# Patient Record
Sex: FEMALE | Race: WHITE | Employment: FULL TIME | URBAN - METROPOLITAN AREA
[De-identification: names, ages, dates, MRNs, and addresses within clinical notes are randomized per-mention and may not be internally consistent; named-entity substitution may affect disease eponyms.]

---

## 2017-04-02 PROCEDURE — 99285 EMERGENCY DEPT VISIT HI MDM: CPT

## 2017-04-03 ENCOUNTER — APPOINTMENT (OUTPATIENT)
Dept: MRI IMAGING | Age: 32
End: 2017-04-03
Attending: PSYCHIATRY & NEUROLOGY
Payer: COMMERCIAL

## 2017-04-03 ENCOUNTER — APPOINTMENT (OUTPATIENT)
Dept: CT IMAGING | Age: 32
End: 2017-04-03
Attending: EMERGENCY MEDICINE
Payer: COMMERCIAL

## 2017-04-03 ENCOUNTER — APPOINTMENT (OUTPATIENT)
Dept: MRI IMAGING | Age: 32
End: 2017-04-03
Attending: INTERNAL MEDICINE
Payer: COMMERCIAL

## 2017-04-03 ENCOUNTER — HOSPITAL ENCOUNTER (OUTPATIENT)
Age: 32
Setting detail: OBSERVATION
Discharge: HOME | End: 2017-04-04
Attending: EMERGENCY MEDICINE | Admitting: INTERNAL MEDICINE
Payer: COMMERCIAL

## 2017-04-03 DIAGNOSIS — R29.898 LUE WEAKNESS: Primary | ICD-10-CM

## 2017-04-03 DIAGNOSIS — R20.2 NUMBNESS AND TINGLING OF LEFT SIDE OF FACE: ICD-10-CM

## 2017-04-03 DIAGNOSIS — R20.0 NUMBNESS AND TINGLING OF LEFT SIDE OF FACE: ICD-10-CM

## 2017-04-03 DIAGNOSIS — R29.898 WEAKNESS OF LEFT UPPER EXTREMITY: ICD-10-CM

## 2017-04-03 LAB
ALBUMIN SERPL BCP-MCNC: 3.1 G/DL (ref 3.5–5)
ALBUMIN SERPL BCP-MCNC: 3.2 G/DL (ref 3.5–5)
ALBUMIN/GLOB SERPL: 0.9 {RATIO} (ref 1.1–2.2)
ALBUMIN/GLOB SERPL: 1 {RATIO} (ref 1.1–2.2)
ALP SERPL-CCNC: 56 U/L (ref 45–117)
ALP SERPL-CCNC: 57 U/L (ref 45–117)
ALT SERPL-CCNC: 23 U/L (ref 12–78)
ALT SERPL-CCNC: 25 U/L (ref 12–78)
AMPHET UR QL SCN: NEGATIVE
ANION GAP BLD CALC-SCNC: 10 MMOL/L (ref 5–15)
ANION GAP BLD CALC-SCNC: 9 MMOL/L (ref 5–15)
APPEARANCE UR: ABNORMAL
AST SERPL W P-5'-P-CCNC: 15 U/L (ref 15–37)
AST SERPL W P-5'-P-CCNC: 15 U/L (ref 15–37)
ATRIAL RATE: 62 BPM
BACTERIA URNS QL MICRO: ABNORMAL /HPF
BARBITURATES UR QL SCN: NEGATIVE
BASOPHILS # BLD AUTO: 0 K/UL (ref 0–0.1)
BASOPHILS # BLD AUTO: 0 K/UL (ref 0–0.1)
BASOPHILS # BLD: 0 % (ref 0–1)
BASOPHILS # BLD: 0 % (ref 0–1)
BENZODIAZ UR QL: NEGATIVE
BILIRUB SERPL-MCNC: 0.3 MG/DL (ref 0.2–1)
BILIRUB SERPL-MCNC: 0.4 MG/DL (ref 0.2–1)
BILIRUB UR QL: NEGATIVE
BUN SERPL-MCNC: 15 MG/DL (ref 6–20)
BUN SERPL-MCNC: 9 MG/DL (ref 6–20)
BUN/CREAT SERPL: 10 (ref 12–20)
BUN/CREAT SERPL: 20 (ref 12–20)
CALCIUM SERPL-MCNC: 8.6 MG/DL (ref 8.5–10.1)
CALCIUM SERPL-MCNC: 8.6 MG/DL (ref 8.5–10.1)
CALCULATED P AXIS, ECG09: 10 DEGREES
CALCULATED R AXIS, ECG10: 55 DEGREES
CALCULATED T AXIS, ECG11: 36 DEGREES
CANNABINOIDS UR QL SCN: NEGATIVE
CHLORIDE SERPL-SCNC: 105 MMOL/L (ref 97–108)
CHLORIDE SERPL-SCNC: 105 MMOL/L (ref 97–108)
CHOLEST SERPL-MCNC: 142 MG/DL
CK MB CFR SERPL CALC: NORMAL % (ref 0–2.5)
CK MB SERPL-MCNC: <1 NG/ML (ref 5–25)
CK SERPL-CCNC: 80 U/L (ref 26–192)
CO2 SERPL-SCNC: 25 MMOL/L (ref 21–32)
CO2 SERPL-SCNC: 29 MMOL/L (ref 21–32)
COCAINE UR QL SCN: NEGATIVE
COLOR UR: ABNORMAL
CREAT SERPL-MCNC: 0.74 MG/DL (ref 0.55–1.02)
CREAT SERPL-MCNC: 0.92 MG/DL (ref 0.55–1.02)
CRP SERPL-MCNC: 2.51 MG/DL (ref 0–0.6)
D DIMER PPP FEU-MCNC: 0.95 MG/L FEU (ref 0–0.65)
DIAGNOSIS, 93000: NORMAL
DRUG SCRN COMMENT,DRGCM: NORMAL
EOSINOPHIL # BLD: 0.2 K/UL (ref 0–0.4)
EOSINOPHIL # BLD: 0.2 K/UL (ref 0–0.4)
EOSINOPHIL NFR BLD: 2 % (ref 0–7)
EOSINOPHIL NFR BLD: 4 % (ref 0–7)
EPITH CASTS URNS QL MICRO: ABNORMAL /LPF
ERYTHROCYTE [DISTWIDTH] IN BLOOD BY AUTOMATED COUNT: 12.9 % (ref 11.5–14.5)
ERYTHROCYTE [DISTWIDTH] IN BLOOD BY AUTOMATED COUNT: 13 % (ref 11.5–14.5)
ERYTHROCYTE [SEDIMENTATION RATE] IN BLOOD: 6 MM/HR (ref 0–20)
EST. AVERAGE GLUCOSE BLD GHB EST-MCNC: NORMAL MG/DL
GLOBULIN SER CALC-MCNC: 3.3 G/DL (ref 2–4)
GLOBULIN SER CALC-MCNC: 3.6 G/DL (ref 2–4)
GLUCOSE SERPL-MCNC: 112 MG/DL (ref 65–100)
GLUCOSE SERPL-MCNC: 93 MG/DL (ref 65–100)
GLUCOSE UR STRIP.AUTO-MCNC: NEGATIVE MG/DL
HBA1C MFR BLD: 4.7 % (ref 4.2–6.3)
HCG UR QL: NEGATIVE
HCT VFR BLD AUTO: 38.5 % (ref 35–47)
HCT VFR BLD AUTO: 38.5 % (ref 35–47)
HDLC SERPL-MCNC: 67 MG/DL
HDLC SERPL: 2.1 {RATIO} (ref 0–5)
HGB BLD-MCNC: 13.2 G/DL (ref 11.5–16)
HGB BLD-MCNC: 13.4 G/DL (ref 11.5–16)
HGB UR QL STRIP: NEGATIVE
HYALINE CASTS URNS QL MICRO: ABNORMAL /LPF (ref 0–5)
KETONES UR QL STRIP.AUTO: 15 MG/DL
LDLC SERPL CALC-MCNC: 50.4 MG/DL (ref 0–100)
LEUKOCYTE ESTERASE UR QL STRIP.AUTO: ABNORMAL
LIPID PROFILE,FLP: NORMAL
LYMPHOCYTES # BLD AUTO: 39 % (ref 12–49)
LYMPHOCYTES # BLD AUTO: 45 % (ref 12–49)
LYMPHOCYTES # BLD: 2.8 K/UL (ref 0.8–3.5)
LYMPHOCYTES # BLD: 3 K/UL (ref 0.8–3.5)
MCH RBC QN AUTO: 30.9 PG (ref 26–34)
MCH RBC QN AUTO: 31.2 PG (ref 26–34)
MCHC RBC AUTO-ENTMCNC: 34.3 G/DL (ref 30–36.5)
MCHC RBC AUTO-ENTMCNC: 34.8 G/DL (ref 30–36.5)
MCV RBC AUTO: 89.7 FL (ref 80–99)
MCV RBC AUTO: 90.2 FL (ref 80–99)
METHADONE UR QL: NEGATIVE
MONOCYTES # BLD: 0.5 K/UL (ref 0–1)
MONOCYTES # BLD: 0.5 K/UL (ref 0–1)
MONOCYTES NFR BLD AUTO: 7 % (ref 5–13)
MONOCYTES NFR BLD AUTO: 7 % (ref 5–13)
MUCOUS THREADS URNS QL MICRO: ABNORMAL /LPF
NEUTS SEG # BLD: 2.9 K/UL (ref 1.8–8)
NEUTS SEG # BLD: 3.6 K/UL (ref 1.8–8)
NEUTS SEG NFR BLD AUTO: 44 % (ref 32–75)
NEUTS SEG NFR BLD AUTO: 52 % (ref 32–75)
NITRITE UR QL STRIP.AUTO: NEGATIVE
OPIATES UR QL: NEGATIVE
P-R INTERVAL, ECG05: 108 MS
PCP UR QL: NEGATIVE
PH UR STRIP: 6.5 [PH] (ref 5–8)
PLATELET # BLD AUTO: 182 K/UL (ref 150–400)
PLATELET # BLD AUTO: 190 K/UL (ref 150–400)
POTASSIUM SERPL-SCNC: 3.3 MMOL/L (ref 3.5–5.1)
POTASSIUM SERPL-SCNC: 3.4 MMOL/L (ref 3.5–5.1)
PROT SERPL-MCNC: 6.5 G/DL (ref 6.4–8.2)
PROT SERPL-MCNC: 6.7 G/DL (ref 6.4–8.2)
PROT UR STRIP-MCNC: ABNORMAL MG/DL
Q-T INTERVAL, ECG07: 446 MS
QRS DURATION, ECG06: 96 MS
QTC CALCULATION (BEZET), ECG08: 452 MS
RBC # BLD AUTO: 4.27 M/UL (ref 3.8–5.2)
RBC # BLD AUTO: 4.29 M/UL (ref 3.8–5.2)
RBC #/AREA URNS HPF: ABNORMAL /HPF (ref 0–5)
SODIUM SERPL-SCNC: 140 MMOL/L (ref 136–145)
SODIUM SERPL-SCNC: 143 MMOL/L (ref 136–145)
SP GR UR REFRACTOMETRY: 1.02 (ref 1–1.03)
TRIGL SERPL-MCNC: 123 MG/DL (ref ?–150)
TROPONIN I SERPL-MCNC: <0.04 NG/ML
UA: UC IF INDICATED,UAUC: ABNORMAL
UROBILINOGEN UR QL STRIP.AUTO: 1 EU/DL (ref 0.2–1)
VENTRICULAR RATE, ECG03: 62 BPM
VLDLC SERPL CALC-MCNC: 24.6 MG/DL
WBC # BLD AUTO: 6.5 K/UL (ref 3.6–11)
WBC # BLD AUTO: 7.1 K/UL (ref 3.6–11)
WBC URNS QL MICRO: ABNORMAL /HPF (ref 0–4)

## 2017-04-03 PROCEDURE — 96361 HYDRATE IV INFUSION ADD-ON: CPT

## 2017-04-03 PROCEDURE — 99218 HC RM OBSERVATION: CPT

## 2017-04-03 PROCEDURE — 86140 C-REACTIVE PROTEIN: CPT | Performed by: INTERNAL MEDICINE

## 2017-04-03 PROCEDURE — 72156 MRI NECK SPINE W/O & W/DYE: CPT

## 2017-04-03 PROCEDURE — 96367 TX/PROPH/DG ADDL SEQ IV INF: CPT

## 2017-04-03 PROCEDURE — 93005 ELECTROCARDIOGRAM TRACING: CPT

## 2017-04-03 PROCEDURE — 82550 ASSAY OF CK (CPK): CPT | Performed by: INTERNAL MEDICINE

## 2017-04-03 PROCEDURE — 85652 RBC SED RATE AUTOMATED: CPT | Performed by: EMERGENCY MEDICINE

## 2017-04-03 PROCEDURE — 96366 THER/PROPH/DIAG IV INF ADDON: CPT

## 2017-04-03 PROCEDURE — 36415 COLL VENOUS BLD VENIPUNCTURE: CPT | Performed by: INTERNAL MEDICINE

## 2017-04-03 PROCEDURE — 80307 DRUG TEST PRSMV CHEM ANLYZR: CPT | Performed by: INTERNAL MEDICINE

## 2017-04-03 PROCEDURE — 85025 COMPLETE CBC W/AUTO DIFF WBC: CPT | Performed by: EMERGENCY MEDICINE

## 2017-04-03 PROCEDURE — 74011250637 HC RX REV CODE- 250/637: Performed by: INTERNAL MEDICINE

## 2017-04-03 PROCEDURE — 85379 FIBRIN DEGRADATION QUANT: CPT | Performed by: INTERNAL MEDICINE

## 2017-04-03 PROCEDURE — 74011250636 HC RX REV CODE- 250/636: Performed by: INTERNAL MEDICINE

## 2017-04-03 PROCEDURE — 81025 URINE PREGNANCY TEST: CPT

## 2017-04-03 PROCEDURE — 96365 THER/PROPH/DIAG IV INF INIT: CPT

## 2017-04-03 PROCEDURE — 81001 URINALYSIS AUTO W/SCOPE: CPT | Performed by: EMERGENCY MEDICINE

## 2017-04-03 PROCEDURE — 80053 COMPREHEN METABOLIC PANEL: CPT | Performed by: EMERGENCY MEDICINE

## 2017-04-03 PROCEDURE — A9585 GADOBUTROL INJECTION: HCPCS | Performed by: INTERNAL MEDICINE

## 2017-04-03 PROCEDURE — 87086 URINE CULTURE/COLONY COUNT: CPT | Performed by: EMERGENCY MEDICINE

## 2017-04-03 PROCEDURE — 70450 CT HEAD/BRAIN W/O DYE: CPT

## 2017-04-03 PROCEDURE — 84484 ASSAY OF TROPONIN QUANT: CPT | Performed by: INTERNAL MEDICINE

## 2017-04-03 PROCEDURE — 73221 MRI JOINT UPR EXTREM W/O DYE: CPT

## 2017-04-03 PROCEDURE — 86038 ANTINUCLEAR ANTIBODIES: CPT | Performed by: PSYCHIATRY & NEUROLOGY

## 2017-04-03 PROCEDURE — 70553 MRI BRAIN STEM W/O & W/DYE: CPT

## 2017-04-03 PROCEDURE — 96372 THER/PROPH/DIAG INJ SC/IM: CPT

## 2017-04-03 PROCEDURE — 80061 LIPID PANEL: CPT | Performed by: INTERNAL MEDICINE

## 2017-04-03 PROCEDURE — 74011000258 HC RX REV CODE- 258: Performed by: INTERNAL MEDICINE

## 2017-04-03 PROCEDURE — 83036 HEMOGLOBIN GLYCOSYLATED A1C: CPT | Performed by: INTERNAL MEDICINE

## 2017-04-03 PROCEDURE — 80053 COMPREHEN METABOLIC PANEL: CPT | Performed by: INTERNAL MEDICINE

## 2017-04-03 RX ORDER — METHYLPHENIDATE HYDROCHLORIDE 27 MG/1
27 TABLET ORAL
COMMUNITY
End: 2020-12-17

## 2017-04-03 RX ORDER — SODIUM CHLORIDE AND POTASSIUM CHLORIDE .9; .15 G/100ML; G/100ML
SOLUTION INTRAVENOUS CONTINUOUS
Status: DISCONTINUED | OUTPATIENT
Start: 2017-04-03 | End: 2017-04-04

## 2017-04-03 RX ORDER — SODIUM CHLORIDE 0.9 % (FLUSH) 0.9 %
10 SYRINGE (ML) INJECTION
Status: COMPLETED | OUTPATIENT
Start: 2017-04-03 | End: 2017-04-03

## 2017-04-03 RX ORDER — POTASSIUM CHLORIDE 7.45 MG/ML
10 INJECTION INTRAVENOUS ONCE
Status: COMPLETED | OUTPATIENT
Start: 2017-04-03 | End: 2017-04-03

## 2017-04-03 RX ORDER — ACETAMINOPHEN 325 MG/1
650 TABLET ORAL
Status: DISCONTINUED | OUTPATIENT
Start: 2017-04-03 | End: 2017-04-04 | Stop reason: HOSPADM

## 2017-04-03 RX ORDER — POTASSIUM CHLORIDE 14.9 MG/ML
10 INJECTION INTRAVENOUS
Status: DISPENSED | OUTPATIENT
Start: 2017-04-03 | End: 2017-04-03

## 2017-04-03 RX ORDER — DOCUSATE SODIUM 100 MG/1
100 CAPSULE, LIQUID FILLED ORAL 2 TIMES DAILY
Status: DISCONTINUED | OUTPATIENT
Start: 2017-04-03 | End: 2017-04-04 | Stop reason: HOSPADM

## 2017-04-03 RX ORDER — HYDRALAZINE HYDROCHLORIDE 25 MG/1
25 TABLET, FILM COATED ORAL ONCE
Status: COMPLETED | OUTPATIENT
Start: 2017-04-04 | End: 2017-04-04

## 2017-04-03 RX ORDER — ONDANSETRON 2 MG/ML
4 INJECTION INTRAMUSCULAR; INTRAVENOUS
Status: DISCONTINUED | OUTPATIENT
Start: 2017-04-03 | End: 2017-04-04 | Stop reason: HOSPADM

## 2017-04-03 RX ORDER — ENOXAPARIN SODIUM 100 MG/ML
40 INJECTION SUBCUTANEOUS EVERY 24 HOURS
Status: DISCONTINUED | OUTPATIENT
Start: 2017-04-03 | End: 2017-04-04 | Stop reason: HOSPADM

## 2017-04-03 RX ADMIN — POTASSIUM CHLORIDE 10 MEQ: 10 INJECTION, SOLUTION INTRAVENOUS at 08:16

## 2017-04-03 RX ADMIN — SODIUM CHLORIDE AND POTASSIUM CHLORIDE: 9; 1.49 INJECTION, SOLUTION INTRAVENOUS at 21:36

## 2017-04-03 RX ADMIN — Medication 1 CAPSULE: at 10:44

## 2017-04-03 RX ADMIN — SODIUM CHLORIDE AND POTASSIUM CHLORIDE: 9; 1.49 INJECTION, SOLUTION INTRAVENOUS at 05:35

## 2017-04-03 RX ADMIN — ENOXAPARIN SODIUM 40 MG: 100 INJECTION SUBCUTANEOUS at 10:44

## 2017-04-03 RX ADMIN — DOCUSATE SODIUM 100 MG: 100 CAPSULE, LIQUID FILLED ORAL at 10:43

## 2017-04-03 RX ADMIN — GADOBUTROL 7.5 ML: 604.72 INJECTION INTRAVENOUS at 21:07

## 2017-04-03 RX ADMIN — CEFTRIAXONE 1 G: 1 INJECTION, POWDER, FOR SOLUTION INTRAMUSCULAR; INTRAVENOUS at 05:36

## 2017-04-03 RX ADMIN — Medication 10 ML: at 21:08

## 2017-04-03 RX ADMIN — POTASSIUM CHLORIDE 10 MEQ: 14.9 INJECTION, SOLUTION INTRAVENOUS at 06:22

## 2017-04-03 NOTE — ED TRIAGE NOTES
When I woke this am at 0800 my L arm felt numb and it has been weak feeling all day. It is tender to touch in the upper arm.

## 2017-04-03 NOTE — H&P
[address]   HISTORY AND PHYSICAL       Name:  Safia Mo   MR#:  [id number]   :  1985   Account #:  [de-identified]        Date of Adm:  2017       DATE OF ADMISSION: 2017     PRIMARY CARE PHYSICIAN: Sonido Mclean MD    SOURCE OF INFORMATION: The patient and the patient's    who was present at the bedside. CHIEF COMPLAINT: Left upper extremity weakness. HISTORY OF PRESENT ILLNESS: This is a 78-year-old woman with   a past medical history significant for borderline hypertension, polycystic   ovarian syndrome, suspected attention deficit hyperactivity disorder,   who was in her usual state of health until the day of her presentation in   the emergency room when the patient woke up with left upper   extremity weakness. According to the patient, she has had weakness   in all of her extremities for months, but the weakness involving the left   upper extremity got worse today. The patient woke up with weakness. She also has associated headache, which the patient described as a   throbbing headache, which went away when the patient drank plenty of   fluid. She was recently at [facility] for a [employer] party. The   patient stated that she drank more than usual. She has no history of   alcohol abuse. Because of persistent symptoms in the left upper   extremity, the patient was brought to the emergency room for further   evaluation. When the patient arrived at the emergency room, CT scan   of the head was obtained. This was negative. The patient was   subsequently referred to the hospitalist service for placement on   observation. The patient has no history of fever, no rigors, no chills. PAST MEDICAL HISTORY: Polycystic ovarian syndrome, suspected   attention deficit hyperactivity disorder, borderline hypertension. ALLERGIES: THE PATIENT IS ALLERGIC TO.   1. SULFA. 2. TETRACYCLINE. MEDICATIONS:   1. Metformin. 2. Nuvaring. 3. Concerta. FAMILY HISTORY: This was reviewed. No family history of a TIA or   CVA or multiple sclerosis or any neurological disorder. PAST SURGICAL HISTORY: This is significant for left shoulder   surgery 2 years ago following a sports related injury. SOCIAL HISTORY: The patient is a former smoker, admits to social   consumption of alcohol. REVIEW OF SYSTEMS   HEAD, EYES, EARS, NOSE AND THROAT: This is positive for   headache, no blurring of vision, no photophobia. RESPIRATORY: No cough, no shortness of breath, no hemoptysis. CARDIOVASCULAR: No chest pain, no orthopnea, no palpitation. GASTROINTESTINAL: No nausea, vomiting, no diarrhea, no   constipation. GENITOURINARY: No dysuria, no urgency, and no frequency. All other systems are reviewed and they are negative. PHYSICAL EXAMINATION   GENERAL APPEARANCE: The patient appeared ill, in moderate   distress. VITAL SIGNS: On arrival at the emergency room, temperature 97.9,   pulse 78, respiratory rate 16, blood pressure 177/110, oxygen   saturation 100% on room air. HEAD: Normocephalic, atraumatic. EYES: Normal eye movement. No redness, no drainage, no distress. EARS: Normal external ears with no obvious drainage. NOSE: No deformity, no drainage. MOUTH AND THROAT: No visible oral lesions. NECK: Supple. No JVD. No thyromegaly. CHEST: Clear breath sounds. No wheezing, no crackles. HEART: Normal S1 and S2, regular. No clinically appreciable murmur. ABDOMEN: Soft, nontender, normal bowel sounds. CENTRAL NERVOUS SYSTEM: Alert, oriented x3. No gross focal   neurological deficits. EXTREMITIES: No edema. Pulses 2+ bilaterally. MUSCULOSKELETAL: No obvious joint deformity or swelling. SKIN: No active skin lesions seen in the exposed parts of the body. PSYCHIATRY: Normal mood and affect. LYMPHATIC: No cervical lymphadenopathy. DIAGNOSTIC DATA: CT scan of the head without contrast negative. EKG shows sinus rhythm, no ST and T-wave abnormalities. LABORATORY DATA: Urinalysis: This is significant for negative   nitrites, trace leukocyte esterase, 1+ bacteria. Chemistry: Sodium 143,   potassium 3.3, chloride 105, CO2 of 29, glucose 112, BUN 9,   creatinine 0.92. Calcium 8.6, bilirubin total 0.3, ALT 25, AST 15,   alkaline phosphatase 57, total protein 6.7, albumin level 3.1, globulin at   3.6. Sedimentation rate 6. Hematology: WBC 7.1, hemoglobin 13.4,   hematocrit 38.5, platelets 756. Urine pregnancy test negative. ASSESSMENT:   1. Left upper extremity weakness. 2. Elevated blood pressure. 3. Polycystic ovarian syndrome. 4. Suspected attention deficit hyperactivity disorder. 5. Urinary tract infection. 6. Hypokalemia. PLAN:   1. Left upper extremity weakness. Will place the patient on   observation. The etiology of the left upper extremity weakness is not   clear at this time. We will obtain an MRI of the brain to evaluate the   patient for acute stroke as a possible cause of the left upper extremity. Neurology consult will be requested to assist in evaluation and   treatment. The patient may also require MRI of the cervical spine if   MRI of the brain is negative for acute pathology to evaluate the patient   for cervical radiculopathy as a possible cause of left upper extremity   weakness. The patient has a history of recent travel. She is also on   Nuvaring. There is a concern for hypercoagulable state as a possible   cause of left upper extremity weakness. The patient may require   further evaluation for the hypercoagulable state. Will obtain a D-dimer   to evaluate the patient for thromboembolism. The patient has a risk   factor for thromboembolism. 2. Elevated blood pressure. The patient has history of borderline   hypertension for which the patient is not on any medication. We will   check a TSH level. We will monitor the patient's blood pressure   closely.  The patient may require the antihypertensive medication if the   average blood pressure remains elevated. 3. Polycystic ovarian syndrome. The patient is on metformin for this   disorder. The patient will resume metformin upon discharge from the   hospital.   4. Suspected attention deficit hyperactivity disorder. The patient only   takes medication for this suspected disorder when she is at work. She   has not taken any medication for a few days because she is on   vacation. 5. Acute urinary tract infection. We will start the patient on Rocephin. Will await urine culture. 6. Hypokalemia. We will replace potassium, will repeat potassium   level. Will also check magnesium level. OTHER ISSUES:   1. CODE STATUS: The patient is a FULL CODE. 2. We will request SCDs for DVT prophylaxis.          Filemon Arias MD      RE / CD   D:  04/03/2017   03:46   T:  04/03/2017   04:59   Job #:  [id number]

## 2017-04-03 NOTE — ED PROVIDER NOTES
Patient is a 34 y.o. female presenting with weakness. Extremity Weakness    Pertinent negatives include no back pain and no neck pain. 34year old female with PCOS, seasonal allergies, borderline BP, presents with left upper extrem weakness and numbness all day. Woke up this morning and noticed weakness. SHe was in [place] this past weekend for a [employer] party and did drink more then normal. She does not think she fell or slept funny, she woke up sleeping on her right side. Did have a moderate throbbing headache but that seems better- she drank fluids thinking dehydration/hang over. No neck pain. No chest pain/palpitations/sob. Eating/drinking fine. No family history of stroke. She is on nuvoring and they have been monitoring her bp but not medicated. Denies facial involvement, speech or vision problems. Denies numbness/weakness in her leg. Felt some bilateral tingling in both legs and head earlier but not like what she is experiencing in her left upper arm and felt she was getting anxious about the arm weakness. Can't use her left hand. Patient is right handed. Past Medical History:   Diagnosis Date    H/O seasonal allergies     Polycystic ovary        Past Surgical History:   Procedure Laterality Date    HX ORTHOPAEDIC      L shoulder         History reviewed. No pertinent family history. Social History     Social History    Marital status: SINGLE     Spouse name: N/A    Number of children: N/A    Years of education: N/A     Occupational History    Not on file.      Social History Main Topics    Smoking status: Former Smoker    Smokeless tobacco: Not on file    Alcohol use Yes      Comment: socially    Drug use: No    Sexual activity: Not on file     Other Topics Concern    Not on file     Social History Narrative    No narrative on file         ALLERGIES: Codeine; Sulfa (sulfonamide antibiotics); and Tetracycline    Review of Systems   Constitutional: Negative for chills and fever.   HENT: Negative for congestion. Eyes: Negative for visual disturbance. Respiratory: Negative for cough and shortness of breath. Cardiovascular: Negative for chest pain. Gastrointestinal: Negative for abdominal pain, nausea and vomiting. Endocrine: Negative for polyuria. Genitourinary: Negative for dysuria. Musculoskeletal: Negative for back pain, gait problem and neck pain. Skin: Negative for rash. Neurological: Positive for weakness and headaches. Psychiatric/Behavioral: Negative for dysphoric mood. Vitals:    04/02/17 2351   BP: (!) 177/110   Pulse: 78   Resp: 16   Temp: 97.9 °F (36.6 °C)   SpO2: 100%   Weight: 80.9 kg (178 lb 6 oz)   Height: 5' 6\" (1.676 m)            Physical Exam   Constitutional: She is oriented to person, place, and time. She appears well-developed and well-nourished. No distress. HENT:   Head: Normocephalic and atraumatic. Mouth/Throat: Oropharynx is clear and moist. No oropharyngeal exudate. Eyes: Conjunctivae and EOM are normal. Pupils are equal, round, and reactive to light. Right eye exhibits no discharge. Left eye exhibits no discharge. No scleral icterus. Neck: Normal range of motion. Neck supple. No JVD present. Cardiovascular: Normal rate, regular rhythm, normal heart sounds and intact distal pulses. Exam reveals no gallop and no friction rub. No murmur heard. Pulmonary/Chest: Effort normal and breath sounds normal. No stridor. No respiratory distress. She has no wheezes. She has no rales. She exhibits no tenderness. Abdominal: Soft. Bowel sounds are normal. She exhibits no distension and no mass. There is no tenderness. There is no rebound and no guarding. Musculoskeletal: Normal range of motion. She exhibits no edema or tenderness. Neurological: She is alert and oriented to person, place, and time. She has normal reflexes. No cranial nerve deficit. She exhibits normal muscle tone.  Coordination normal.   Skin: Skin is warm and dry. No rash noted. No erythema. Psychiatric: She has a normal mood and affect. Her behavior is normal. Judgment and thought content normal.        [facility]  ED Course       Procedures      ED EKG interpretation:  Rhythm: normal sinus rhythm; and regular . Rate (approx.): 62; Axis: normal; P wave: normal; QRS interval: normal ; ST/T wave: non-specific changes;. This EKG was interpreted by Sofia Escobar MD,ED Provider. Work up reassuring- suspect nerve palsy but with numbness of entire arm, elevated BP/birth control risk factors, will admit for stroke work up.

## 2017-04-03 NOTE — ROUTINE PROCESS
TRANSFER - OUT REPORT:    Verbal report given to MARIA ELENA Villavicencio (name) on [facility]  being transferred to [address] [place] [address] (unit) for routine progression of care       Report consisted of patients Situation, Background, Assessment and   Recommendations(SBAR). Information from the following report(s) SBAR, ED Summary and Recent Results was reviewed with the receiving nurse. Lines:   Peripheral IV 04/03/17 Right Forearm (Active)   Site Assessment Clean, dry, & intact 4/3/2017  1:12 AM   Phlebitis Assessment 0 4/3/2017  1:12 AM   Infiltration Assessment 0 4/3/2017  1:12 AM   Dressing Status Clean, dry, & intact 4/3/2017  1:12 AM        Opportunity for questions and clarification was provided.       Patient transported with:   [employer]
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
## 2017-04-03 NOTE — IP AVS SNAPSHOT
Current Discharge Medication List  
  
START taking these medications Dose & Instructions Dispensing Information Comments Morning Noon Evening Bedtime  
 lisinopril 10 mg tablet Commonly known as:  Shellie Tracey Your last dose was: Your next dose is:    
   
   
 Dose:  10 mg Take 1 Tab by mouth daily. Indications: hypertension Quantity:  30 Tab Refills:  0  
     
   
   
   
  
 methylPREDNISolone 4 mg tablet Commonly known as:  Demario Rodrigues Your last dose was: Your next dose is:    
   
   
 Per dose pack instructions Quantity:  1 Dose Pack Refills:  0  
     
   
   
   
  
 OTHER(NON-FORMULARY) Your last dose was: Your next dose is: Outpatient physical therapy to evaluate and treat. Quantity:  1 Each Refills:  0 CONTINUE these medications which have NOT CHANGED Dose & Instructions Dispensing Information Comments Morning Noon Evening Bedtime CONCERTA 27 mg CR tablet Generic drug:  methylphenidate Your last dose was: Your next dose is:    
   
   
 Dose:  27 mg Take 27 mg by mouth every morning. Refills:  0 Where to Get Your Medications Information on where to get these meds will be given to you by the nurse or doctor. ! Ask your nurse or doctor about these medications  
  lisinopril 10 mg tablet  
 methylPREDNISolone 4 mg tablet OTHER(NON-FORMULARY)
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
## 2017-04-03 NOTE — CONSULTS
NEUROLOGY  4/3/2017     Consulted by: Brittnee Love MD        Patient ID:  Darin Reid  [id number]  34 y.o.  1985    Chief Complaint   Patient presents with    Extremity Weakness       HPI    Harpreet Menjivar is a 28-year-old woman with a history of hypertension and PCO S who is in the hospital for weakness. She tells me that yesterday morning she woke up and felt her left arm particularly in the hand and wrist was weak and numb. Felt some weakness in the shoulder. She ultimately drove home from [place] and during that time she felt her left leg might of had some patchy numbness as well. She also felt some facial numbness later in the day. She had a headache which resolved. She tells me that the night before presentation she was at a [employer] party and did drink more than usual.  She also returned from [facility] about 2 weeks ago where she was therefore work for about 1 week and was staying [place] a western style hotel. Overall symptoms are improving but the left hand still feels somewhat numb. No pain. Review of Systems   Constitutional: Positive for malaise/fatigue. Neurological: Positive for sensory change, focal weakness and headaches. All other systems reviewed and are negative. Past Medical History:   Diagnosis Date    H/O seasonal allergies     Polycystic ovary      History reviewed. No pertinent family history. Social History     Social History    Marital status: SINGLE     Spouse name: N/A    Number of children: N/A    Years of education: N/A     Occupational History    Not on file.      Social History Main Topics    Smoking status: Former Smoker    Smokeless tobacco: Not on file    Alcohol use Yes      Comment: socially    Drug use: No    Sexual activity: Not on file     Other Topics Concern    Not on file     Social History Narrative    No narrative on file     Current Facility-Administered Medications   Medication Dose Route Frequency    0.9% sodium chloride with KCl 20 mEq/L infusion   IntraVENous CONTINUOUS    acetaminophen (TYLENOL) tablet 650 mg  650 mg Oral Q4H PRN    ondansetron (ZOFRAN) injection 4 mg  4 mg IntraVENous Q4H PRN    docusate sodium (COLACE) capsule 100 mg  100 mg Oral BID    enoxaparin (LOVENOX) injection 40 mg  40 mg SubCUTAneous Q24H    cefTRIAXone (ROCEPHIN) 1 g in 0.9% sodium chloride (MBP/ADV) 50 mL  1 g IntraVENous Q24H    lactobac ac& pc-s.therm-b.anim (MARYSOL Q/RISAQUAD)  1 Cap Oral DAILY     Allergies   Allergen Reactions    Codeine Nausea Only    Sulfa (Sulfonamide Antibiotics) Hives and Swelling    Tetracycline Hives       Visit Vitals    BP (!) 157/96 (BP 1 Location: Left arm, BP Patient Position: At rest)    Pulse 70    Temp 98.8 °F (37.1 °C)    Resp 16    Ht 5' 6\" (1.676 m)    Wt 80.9 kg (178 lb 6 oz)    LMP 02/18/2017 (Exact Date)    SpO2 98%    Breastfeeding No    BMI 28.79 kg/m2     Physical Exam   Constitutional: She appears well-developed and well-nourished. Cardiovascular: Normal rate. Pulmonary/Chest: Effort normal.   Neurological: Gait normal.   Skin: Skin is warm and dry. Psychiatric: Her speech is normal.   Vitals reviewed. Neurologic Exam     Mental Status   Attention: normal.   Speech: speech is normal   Level of consciousness: alert    Cranial Nerves   Cranial nerves II through XII intact. Motor Exam   Muscle bulk: normal  Overall muscle tone: normal  Right arm pronator drift: absent  Left arm pronator drift: present       Distal left hand globally weak.      Sensory Exam   Light touch normal.     Gait, Coordination, and Reflexes     Gait  Gait: normal    Tremor   Resting tremor: absent           Lab Results  Component Value Date/Time   WBC 6.5 04/03/2017 05:40 AM   HGB 13.2 04/03/2017 05:40 AM   HCT 38.5 04/03/2017 05:40 AM   PLATELET 251 14/81/9516 05:40 AM   MCV 90.2 04/03/2017 05:40 AM       Lab Results  Component Value Date/Time   Hemoglobin A1c 4.7 04/03/2017 05:40 AM   Glucose 93 04/03/2017 05:40 AM   LDL, calculated 50.4 04/03/2017 05:40 AM   Creatinine 0.74 04/03/2017 05:40 AM      Lab Results  Component Value Date/Time   Cholesterol, total 142 04/03/2017 05:40 AM   HDL Cholesterol 67 04/03/2017 05:40 AM   LDL, calculated 50.4 04/03/2017 05:40 AM   Triglyceride 123 04/03/2017 05:40 AM   CHOL/HDL Ratio 2.1 04/03/2017 05:40 AM       Lab Results  Component Value Date/Time   ALT (SGPT) 23 04/03/2017 05:40 AM   AST (SGOT) 15 04/03/2017 05:40 AM   Alk. phosphatase 56 04/03/2017 05:40 AM   Bilirubin, total 0.4 04/03/2017 05:40 AM       No results found for: TSH, TSH2, TSH3, TSHP, TSHELE, TSHEXT, TT3, T3U, T3UP, FRT3, FT3, FT4, FT4P, T4, T4P, FT4T, TT7, TSHEXT      CT Results (maximum last 3): Results from [place] encounter on 04/03/17   CT HEAD WO CONT   Narrative EXAM:  CT HEAD WO CONT    INDICATION:   Left arm numb and weak today. COMPARISON: None. TECHNIQUE: Unenhanced CT of the head was performed using 5 mm images. Brain and  bone windows were generated. CT dose reduction was achieved through use of a  standardized protocol tailored for this examination and automatic exposure  control for dose modulation. FINDINGS:  The ventricles and sulci are normal in size, shape and configuration and  midline. There is no significant white matter disease. There is no intracranial  hemorrhage, extra-axial collection, mass, mass effect or midline shift. The  basilar cisterns are open. No acute infarct is identified. The bone windows  demonstrate no abnormalities. The visualized portions of the paranasal sinuses  and mastoid air cells are clear. Impression IMPRESSION: Normal            MRI Results (maximum last 3): Results from [place] encounter on 06/06/14   MRI SHOULDER LT WO CONT   Narrative **Final Report**      ICD Codes / Adm. Diagnosis: 719.41   / Pain in joint, shoulder region    Examination:  MR SHOULDER WO CON LT  - [id number] - Jun 6 2014  9:11AM  Accession No: [id number]  Reason:  Pain in joint, shoulder region      REPORT:  EXAM:  MR SHOULDER WO CON LT    INDICATION: Left shoulder pain post fall. Decreased range of motion    COMPARISON: None    TECHNIQUE: Axial proton density fat-saturated; oblique coronal T1, T2   fat-saturated, and proton density fat-saturated; and oblique sagittal T2   fat-saturated MRI of the left shoulder. CONTRAST: None. FINDINGS: A.C. joint: Normal for age. Anterior acromion process type: 2    Bone marrow: There is a nondisplaced fracture of the greater tuberosity    Joint fluid: There is trace fluid in the subacromial bursa    Rotator cuff tendons: There is slight edema within the supraspinatus due to   adjacent bony trauma. No rotator cuff tear    Biceps tendon: Intact and located within the bicipital groove. Muscles: No edema or atrophy. Glenoid labrum: Intact. Joint capsule: within normal limits. Glenohumeral articular cartilage: Intact. No focal osteochondral lesion. Soft tissue mass: None. IMPRESSION:  1. Nondisplaced acute fracture of the greater tuberosity with edema in the   adjacent rotator cuff. No rotator cuff tear    Findings were phoned to Dr. Juan Oliva office at the time of interpretation           Signing/Reading Doctor: Aman Hopkins ([id number])    Approved: Milo PNOCE ([id number])  Jun 6 2014 10:39AM                                   VAS/US/Carotid Doppler Results (maximum last 3): No results found for this or any previous visit. PET Results (maximum last 3): No results found for this or any previous visit. Assessment and Plan        44-year-old woman with left-sided paresthesias nonlocalizing. She has mild risk factors for stroke. Does not sound infectious in origin given the presentation and timeline. Migrainous presentation also possible but diagnosis of exclusion. MS on the differential.    Neuroimaging ordered.   MRI brain and cervical spine with contrast.    We will follow-up after imaging is done.         Brian Black DO  NEUROLOGIST  Diplomate ABPN  4/3/2017

## 2017-04-03 NOTE — PROGRESS NOTES
TRANSFER - IN REPORT:    Verbal report received from [facility]) on Verneda Duverney  being received from ED(unit) for routine progression of care      Report consisted of patients Situation, Background, Assessment and   Recommendations(SBAR). Information from the following report(s) SBAR, Kardex, ED Summary and Recent Results was reviewed with the receiving nurse. Opportunity for questions and clarification was provided. Assessment completed upon patients arrival to unit and care assumed.        Primary Nurse Laith Collins and MARIA ELENA Burch performed a dual skin assessment on this patient No impairment noted  Chris score is 23

## 2017-04-03 NOTE — IP AVS SNAPSHOT
[facility] [address] [place] [address] 
[phone] Patient: Verneda Duverney MRN: [id number] MTB:[birth year] You are allergic to the following Allergen Reactions Codeine Nausea Only Sulfa (Sulfonamide Antibiotics) Hives Swelling Tetracycline Hives Recent Documentation Height Weight Breastfeeding? BMI OB Status Smoking Status 1.676 m 80.9 kg No 28.79 kg/m2 Having regular periods Former Smoker Emergency Contacts Name Discharge Info Relation Home Work Mobile Everett Brizuela  Mother [14] [phone] Rosalio Jacobs  Boyfriend [17] [phone] About your hospitalization You were admitted on:  April 3, 2017 You last received care in the:  [address] [place] [address] You were discharged on:  April 4, 2017 Unit phone number:  [phone] Why you were hospitalized Your primary diagnosis was:  Weakness Of Left Upper Extremity Your diagnoses also included:  Numbness And Tingling Of Left Side Of Face, Essential Hypertension Providers Seen During Your Hospitalizations Provider Role Specialty Primary office phone Sathya Su MD Attending Provider Emergency Medicine [phone] Trae Gómez MD Attending Provider Internal Medicine [phone] Shanti Griggs MD Attending Provider Internal Medicine [phone] Your Primary Care Physician (PCP) Primary Care Physician Office Phone Office Fax Arherman Horneron [phone] Follow-up Information Follow up With Details Comments Contact Info Sera Quintana MD   Tuesday, April 11, 2017 at 12 noon [facility] [address] [place] [address] 
[phone] Kenyetta Arzola MD In 1 week Hospital follow up [facility]  [address] 
[phone] Current Discharge Medication List  
  
START taking these medications Dose & Instructions Dispensing Information Comments Morning Noon Evening Bedtime  
 lisinopril 10 mg tablet Commonly known as:  Karene Bolls Your last dose was: Your next dose is:    
   
   
 Dose:  10 mg Take 1 Tab by mouth daily. Indications: hypertension Quantity:  30 Tab Refills:  0  
     
   
   
   
  
 methylPREDNISolone 4 mg tablet Commonly known as:  Lorrane Norberto Your last dose was: Your next dose is:    
   
   
 Per dose pack instructions Quantity:  1 Dose Pack Refills:  0  
     
   
   
   
  
 OTHER(NON-FORMULARY) Your last dose was: Your next dose is: Outpatient physical therapy to evaluate and treat. Quantity:  1 Each Refills:  0 CONTINUE these medications which have NOT CHANGED Dose & Instructions Dispensing Information Comments Morning Noon Evening Bedtime CONCERTA 27 mg CR tablet Generic drug:  methylphenidate Your last dose was: Your next dose is:    
   
   
 Dose:  27 mg Take 27 mg by mouth every morning. Refills:  0 Where to Get Your Medications Information on where to get these meds will be given to you by the nurse or doctor. ! Ask your nurse or doctor about these medications  
  lisinopril 10 mg tablet  
 methylPREDNISolone 4 mg tablet OTHER(NON-FORMULARY) Discharge Instructions Discharge Instructions PATIENT ID: Mirella Rod MRN: [id number] YOB: 1985 DATE OF ADMISSION: 4/3/2017 12:05 AM   
DATE OF DISCHARGE: 4/4/2017 PRIMARY CARE PROVIDER: Holly Gamez MD  
 
ATTENDING PHYSICIAN: Ann Rubio MD 
DISCHARGING PROVIDER: Carlos Manuel Trent NP To contact this individual call [phone] and ask the  to page. If unavailable ask to be transferred the Adult Hospitalist Department. DISCHARGE DIAGNOSES Left arm weakness, High blood pressure CONSULTATIONS: IP CONSULT TO HOSPITALIST 
IP CONSULT TO NEUROLOGY PROCEDURES/SURGERIES: * No surgery found * PENDING TEST RESULTS:  
At the time of discharge the following test results are still pending: none FOLLOW UP APPOINTMENTS:  
Follow-up Information Follow up With Details Comments Contact Info Vidhya Hanna MD  Call to schedule an appointment in 2-3 weeks 9[facility]. [address] [place] [address] 
[phone] Tenzin Decker MD In 1 week Hospital follow up 163 [facility] [address] [place] [address] 13 [facility] 
[phone] ADDITIONAL CARE RECOMMENDATIONS:  
1. We would like you to follow up with Dr Elisabeth Avila with neurosurgery for your left arm numbness and spinal stenosis. See above for her contract information. 2. Your blood pressure was elevated during your admission, so we have start you on a blood pressure medication called Lisinopril. See below for more information on the medication to include common side effects. Make sure to follow up with your primary care provider for further blood pressure management. Make sure to check your blood pressure twice a day at home and record it. Take this record with you to your follow up with your primary care provider. 3. We have also prescribe you a steroid dose pack for your left arm numbness. Also recommending you start outpatient physical therapy. We have provided you with a script that you can take to a physical therapy center of your choice. DIET: cardiac, low salt diet ACTIVITY: as tolerated WOUND CARE: none EQUIPMENT needed: none DISCHARGE MEDICATIONS: 
Prednisone (By mouth) Prednisone (PRED-ni-sone) Treats many diseases and conditions, especially problems related to inflammation. This medicine is a corticosteroid. Brand Name(s):Deltasone, Prednicot, Gideon, Sterapred DS, predniSONE Intensol There may be other brand names for this medicine. When This Medicine Should Not Be Used: This medicine is not right for everyone. Do not use if you had an allergic reaction to prednisone or if you are pregnant. How to Use This Medicine:  
Liquid, Tablet, Delayed Release Tablet · Take your medicine as directed. Your dose may need to be changed several times to find what works best for you. · It is best to take this medicine with food or milk. · Swallow the delayed-release tablet whole. Do not crush, break, or chew it. · Measure the oral liquid medicine with a marked measuring spoon, oral syringe, or medicine cup. · Missed dose: Take a dose as soon as you remember. If it is almost time for your next dose, wait until then and take a regular dose. Do not take extra medicine to make up for a missed dose. · Store the medicine in a closed container at room temperature, away from heat, moisture, and direct light. Do not freeze the oral liquid. Drugs and Foods to Avoid: Ask your doctor or pharmacist before using any other medicine, including over-the-counter medicines, vitamins, and herbal products. · Tell your doctor if you use any of the following: ¨ Aminoglutethimide, amphotericin B, carbamazepine, cholestyramine, cyclosporine, digoxin, isoniazid, ketoconazole, phenobarbital, phenytoin, or rifampin ¨ Blood thinner, such as warfarin ¨ NSAID pain or arthritis medicine, such as aspirin, diclofenac, ibuprofen, naproxen, celecoxib ¨ Diuretic (water pill) ¨ Diabetes medicine ¨ Macrolide antibiotic, such as azithromycin, clarithromycin, erythromycin ¨ Estrogen, including birth control pills or hormone replacement therapy · This medicine may interfere with vaccines. Ask your doctor before you get a flu shot or any other vaccines. Warnings While Using This Medicine: · It is not safe to take this medicine during pregnancy. It could harm an unborn baby. Tell your doctor right away if you become pregnant. · Tell your doctor if you are breastfeeding or if you have kidney problems, heart failure, high blood pressure, a recent heart attack, diabetes, glaucoma, osteoporosis, or thyroid problems. Tell your doctor about any infection you have. Also tell your doctor if you have had mental or emotional problems (such as depression) or stomach or bowel problems (such as an ulcer or diverticulitis). · This medicine may cause the following problems: ¨ Mood or behavior changes ¨ Higher blood pressure, retaining water, changes in salt or potassium levels in your body ¨ Cataracts or glaucoma (with long-term use) ¨ Weak bones or osteoporosis (with long-term use) ¨ Slow growth in children (with long-term use) ¨ Muscle problems (with high doses, especially if you have myasthenia gravis or similar nerve and muscle problems) · Do not stop using this medicine suddenly. Your doctor will need to slowly decrease your dose before you stop it completely. · This medicine could cause you to get infections more easily. Tell your doctor right away if you are exposed to chicken pox, measles, or other serious infection. Tell your doctor if you had a serious infection in the past, such as tuberculosis or herpes. · Tell your doctor about any extra stress or anxiety in your life. Your dose might need to be changed for a short time. · Tell any doctor or dentist who treats you that you are using this medicine. This medicine may affect certain medical test results. · Keep all medicine out of the reach of children. Never share your medicine with anyone. Possible Side Effects While Using This Medicine:  
Call your doctor right away if you notice any of these side effects: · Allergic reaction: Itching or hives, swelling in your face or hands, swelling or tingling in your mouth or throat, chest tightness, trouble breathing · Dark freckles, skin color changes, coldness, weakness, tiredness, nausea, vomiting, weight loss · Depression, unusual thoughts, feelings, or behaviors, trouble sleeping · Fever, chills, cough, sore throat, and body aches · Muscle pain or weakness · Rapid weight gain, swelling in your hands, ankles, or feet · Severe stomach pain, nausea, vomiting, or red or black stools · Skin changes or growths · Trouble seeing, eye pain, headache If you notice these less serious side effects, talk with your doctor: · Increased appetite · Round, puffy face · Weight gain around your neck, upper back, breast, face, or waist 
If you notice other side effects that you think are caused by this medicine, tell your doctor. Call your doctor for medical advice about side effects. You may report side effects to FDA at 8-051-CSD-0100 © 2016 [address] is for End User's use only and may not be sold, redistributed or otherwise used for commercial purposes. The above information is an  only. It is not intended as medical advice for individual conditions or treatments. Talk to your doctor, nurse or pharmacist before following any medical regimen to see if it is safe and effective for you. Lisinopril (By mouth) Lisinopril (lye-SIN-oh-pril) Treats high blood pressure and heart failure. Also given to reduce the risk of death after a heart attack. This medicine is an ACE inhibitor. Brand Name(s):Prinivil, Zestril There may be other brand names for this medicine. When This Medicine Should Not Be Used: This medicine is not right for everyone. Do not use it if you had an allergic reaction to lisinopril or another ACE inhibitor, or if you are pregnant. How to Use This Medicine:  
Tablet · Take your medicine as directed. Your dose may need to be changed several times to find what works best for you. · Missed dose: Take a dose as soon as you remember. If it is almost time for your next dose, wait until then and take a regular dose.  Do not take extra medicine to make up for a missed dose. · Store the medicine in a closed container at room temperature, away from heat, moisture, and direct light. Drugs and Foods to Avoid: Ask your doctor or pharmacist before using any other medicine, including over-the-counter medicines, vitamins, and herbal products. · Do not use this medicine together with aliskiren if you have diabetes. · Some foods and medicines may affect how lisinopril works. Tell your doctor if you are using any of the following: ¨ Aliskiren, everolimus, lithium, sirolimus, temsirolimus ¨ Another blood pressure medicine, including an angiotensin receptor blocker (ARB) ¨ Insulin or diabetes medicine ¨ Diuretic (water pills, including amiloride, spironolactone, triamterene) ¨ NSAID pain or arthritis medicine (including aspirin, celecoxib, diclofenac, ibuprofen, naproxen) · Ask your doctor before you use any medicine, supplement, or salt substitute that contains potassium. Warnings While Using This Medicine: · It is not safe to take this medicine during pregnancy. It could harm an unborn baby. Tell your doctor right away if you become pregnant. · Tell your doctor if you are breastfeeding, or if you have kidney disease, liver disease, diabetes, or heart or blood vessel disease. · This medicine may cause the following problems: ¨ Angioedema (severe swelling) ¨ Kidney problems ¨ Serious liver problems · This medicine could lower your blood pressure too much, especially when you first use it or if you are dehydrated. Stand or sit up slowly if you feel lightheaded or dizzy. · Do not stop using this medicine without asking your doctor, even if you feel well. This medicine will not cure your high blood pressure, but it will help keep it in a normal range. You may have to take blood pressure medicine for the rest of your life. · Tell any doctor or dentist who treats you that you are using this medicine. · Your doctor will do lab tests at regular visits to check on the effects of this medicine. Keep all appointments. · Keep all medicine out of the reach of children. Never share your medicine with anyone. Possible Side Effects While Using This Medicine:  
Call your doctor right away if you notice any of these side effects: · Allergic reaction: Itching or hives, swelling in your face or hands, swelling or tingling in your mouth or throat, chest tightness, trouble breathing · Blistering, peeling, or red skin rash · Change in how much or how often you urinate · Confusion, weakness, uneven heartbeat, trouble breathing, numbness or tingling in your hands, feet, or lips · Dark urine or pale stools, nausea, vomiting, loss of appetite, stomach pain, yellow skin or eyes · Fever, chills, sore throat, body aches · Lightheadedness, dizziness, fainting · Severe stomach pain (with or without nausea or vomiting) If you notice these less serious side effects, talk with your doctor: · Dry cough If you notice other side effects that you think are caused by this medicine, tell your doctor. Call your doctor for medical advice about side effects. You may report side effects to FDA at 5-938-FDA-8590 © 2016 [address] is for End User's use only and may not be sold, redistributed or otherwise used for commercial purposes. The above information is an  only. It is not intended as medical advice for individual conditions or treatments. Talk to your doctor, nurse or pharmacist before following any medical regimen to see if it is safe and effective for you. See Medication Reconciliation Form · It is important that you take the medication exactly as they are prescribed. · Keep your medication in the bottles provided by the pharmacist and keep a list of the medication names, dosages, and times to be taken in your wallet. · Do not take other medications without consulting your doctor. NOTIFY YOUR PHYSICIAN FOR ANY OF THE FOLLOWING:  
Fever over 101 degrees for 24 hours. Chest pain, shortness of breath, fever, chills, nausea, vomiting, diarrhea, change in mentation, falling, weakness, bleeding. Severe pain or pain not relieved by medications. Or, any other signs or symptoms that you may have questions about. DISPOSITION: 
X  Home With: 
 OT  PT  [facility]  RN  
  
 SNF/Inpatient Rehab/LTAC Independent/assisted living Hospice Other: PROBLEM LIST Updated: Yes X Signed:  
Camron Issa NP 
4/4/2017 
8:59 AM 
 
 
 
  
Learning About High Blood Pressure What is high blood pressure? Blood pressure is a measure of how hard the blood pushes against the walls of your arteries. It's normal for blood pressure to go up and down throughout the day, but if it stays up, you have high blood pressure. Another name for high blood pressure is hypertension. Two numbers tell you your blood pressure. The first number is the systolic pressure. It shows how hard the blood pushes when your heart is pumping. The second number is the diastolic pressure. It shows how hard the blood pushes between heartbeats, when your heart is relaxed and filling with blood. A blood pressure of less than 120/80 (say \"120 over 80\") is ideal for an adult. High blood pressure is 140/90 or higher. You have high blood pressure if your top number is 140 or higher or your bottom number is 90 or higher, or both. Many people fall into the category in between, called prehypertension. People with prehypertension need to make lifestyle changes to bring their blood pressure down and help prevent or delay high blood pressure. What happens when you have high blood pressure? · Blood flows through your arteries with too much force. Over time, this damages the walls of your arteries. But you can't feel it.  High blood pressure usually doesn't cause symptoms. · Fat and calcium start to build up in your arteries. This buildup is called plaque. Plaque makes your arteries narrower and stiffer. Blood can't flow through them as easily. · This lack of good blood flow starts to damage some of the organs in your body. This can lead to problems such as coronary artery disease and heart attack, heart failure, stroke, kidney failure, and eye damage. How can you prevent high blood pressure? · Stay at a healthy weight. · Try to limit how much sodium you eat to less than 2,300 milligrams (mg) a day. If you limit your sodium to 1,500 mg a day, you can lower your blood pressure even more. ¨ Buy foods that are labeled \"unsalted,\" \"sodium-free,\" or \"low-sodium. \" Foods labeled \"reduced-sodium\" and \"light sodium\" may still have too much sodium. ¨ Flavor your food with garlic, lemon juice, onion, vinegar, herbs, and spices instead of salt. Do not use soy sauce, steak sauce, onion salt, garlic salt, mustard, or ketchup on your food. ¨ Use less salt (or none) when recipes call for it. You can often use half the salt a recipe calls for without losing flavor. · Be physically active. Get at least 30 minutes of exercise on most days of the week. Walking is a good choice. You also may want to do other activities, such as running, swimming, cycling, or playing tennis or team sports. · Limit alcohol to 2 drinks a day for men and 1 drink a day for women. · Eat plenty of fruits, vegetables, and low-fat dairy products. Eat less saturated and total fats. How is high blood pressure treated? · Your doctor will suggest making lifestyle changes. For example, your doctor may ask you to eat healthy foods, quit smoking, lose extra weight, and be more active. · If lifestyle changes don't help enough or your blood pressure is very high, you will have to take medicine every day. Follow-up care is a key part of your treatment and safety.  Be sure to make and go to all appointments, and call your doctor if you are having problems. It's also a good idea to know your test results and keep a list of the medicines you take. Where can you learn more? Go to http://kirk-tj.info/. Enter P501 in the search box to learn more about \"Learning About High Blood Pressure. \" Current as of: 2016 Content Version: 11.2 © 0480-2294 [employer]. Care instructions adapted under license by [employer] (which disclaims liability or warranty for this information). If you have questions about a medical condition or this instruction, always ask your healthcare professional. [address] any warranty or liability for your use of this information. Draftster Activation Thank you for requesting access to Draftster. Please follow the instructions below to securely access and download your online medical record. Draftster allows you to send messages to your doctor, view your test results, renew your prescriptions, schedule appointments, and more. How Do I Sign Up? 1. In your internet browser, go to [email or web address] 
2. Click on the First Time User? Click Here link in the Sign In box. You will be redirect to the New Member Sign Up page. 3. Enter your Draftster Access Code exactly as it appears below. You will not need to use this code after youve completed the sign-up process. If you do not sign up before the expiration date, you must request a new code. Draftster Access Code: [email or web address] Expires: 2017 11:51 PM (This is the date your Draftster access code will ) 4. Enter the last four digits of your Social Security Number (xxxx) and Date of Birth (mm/dd/yyyy) as indicated and click Submit. You will be taken to the next sign-up page. 5. Create a Draftster ID. This will be your Draftster login ID and cannot be changed, so think of one that is secure and easy to remember. 6. Create a Breezie password. You can change your password at any time. 7. Enter your Password Reset Question and Answer. This can be used at a later time if you forget your password. 8. Enter your e-mail address. You will receive e-mail notification when new information is available in [address] 9. Click Sign Up. You can now view and download portions of your medical record. 10. Click the Download Summary menu link to download a portable copy of your medical information. Additional Information If you have questions, please visit the Frequently Asked Questions section of the Breezie website at [email or web address] Remember, Breezie is NOT to be used for urgent needs. For medical emergencies, dial 911. Discharge Orders None Breezie Announcement We are excited to announce that we are making your provider's discharge notes available to you in Breezie. You will see these notes when they are completed and signed by the physician that discharged you from your recent hospital stay. If you have any questions or concerns about any information you see in Breezie, please call the Health Information Department where you were seen or reach out to your Primary Care Provider for more information about your plan of care. Introducing [facility]! [employer] introduces Breezie patient portal. Now you can access parts of your medical record, email your doctor's office, and request medication refills online. 1. In your internet browser, go to [email or web address] 2. Click on the First Time User? Click Here link in the Sign In box. You will see the New Member Sign Up page. 3. Enter your Breezie Access Code exactly as it appears below. You will not need to use this code after youve completed the sign-up process. If you do not sign up before the expiration date, you must request a new code.  
 
· Breezie Access Code: [email or web address] 
 Expires: 7/1/2017 11:51 PM 
 
4. Enter the last four digits of your Social Security Number (xxxx) and Date of Birth (mm/dd/yyyy) as indicated and click Submit. You will be taken to the next sign-up page. 5. Create a [email or web address] ID. This will be your [email or web address] login ID and cannot be changed, so think of one that is secure and easy to remember. 6. Create a [email or web address] password. You can change your password at any time. 7. Enter your Password Reset Question and Answer. This can be used at a later time if you forget your password. 8. Enter your e-mail address. You will receive e-mail notification when new information is available in [address] 9. Click Sign Up. You can now view and download portions of your medical record. 10. Click the Download Summary menu link to download a portable copy of your medical information. If you have questions, please visit the Frequently Asked Questions section of the [email or web address] website. Remember, [email or web address] is NOT to be used for urgent needs. For medical emergencies, dial 911. Now available from your iPhone and Android! General Information Please provide this summary of care documentation to your next provider. Patient Signature:  ____________________________________________________________ Date:  ____________________________________________________________  
  
Marlo Breath Provider Signature:  ____________________________________________________________ Date:  ____________________________________________________________

## 2017-04-04 VITALS
OXYGEN SATURATION: 97 % | HEART RATE: 72 BPM | BODY MASS INDEX: 28.67 KG/M2 | DIASTOLIC BLOOD PRESSURE: 93 MMHG | TEMPERATURE: 98.5 F | HEIGHT: 66 IN | WEIGHT: 178.38 LBS | RESPIRATION RATE: 14 BRPM | SYSTOLIC BLOOD PRESSURE: 152 MMHG

## 2017-04-04 PROBLEM — I10 ESSENTIAL HYPERTENSION: Status: ACTIVE | Noted: 2017-04-04

## 2017-04-04 LAB
ANA SER QL: NEGATIVE
BACTERIA SPEC CULT: NORMAL
CC UR VC: NORMAL
CK MB CFR SERPL CALC: NORMAL % (ref 0–2.5)
CK MB SERPL-MCNC: <1 NG/ML (ref 5–25)
CK SERPL-CCNC: 59 U/L (ref 26–192)
MAGNESIUM SERPL-MCNC: 1.9 MG/DL (ref 1.6–2.4)
PHOSPHATE SERPL-MCNC: 2.6 MG/DL (ref 2.6–4.7)
SEE BELOW:, 164879: NORMAL
SERVICE CMNT-IMP: NORMAL
TROPONIN I SERPL-MCNC: <0.04 NG/ML
TSH SERPL DL<=0.05 MIU/L-ACNC: 1.4 UIU/ML (ref 0.36–3.74)

## 2017-04-04 PROCEDURE — 74011250637 HC RX REV CODE- 250/637: Performed by: INTERNAL MEDICINE

## 2017-04-04 PROCEDURE — 84443 ASSAY THYROID STIM HORMONE: CPT | Performed by: INTERNAL MEDICINE

## 2017-04-04 PROCEDURE — 96361 HYDRATE IV INFUSION ADD-ON: CPT

## 2017-04-04 PROCEDURE — 36415 COLL VENOUS BLD VENIPUNCTURE: CPT | Performed by: INTERNAL MEDICINE

## 2017-04-04 PROCEDURE — 84100 ASSAY OF PHOSPHORUS: CPT | Performed by: INTERNAL MEDICINE

## 2017-04-04 PROCEDURE — 83735 ASSAY OF MAGNESIUM: CPT | Performed by: INTERNAL MEDICINE

## 2017-04-04 PROCEDURE — 84484 ASSAY OF TROPONIN QUANT: CPT | Performed by: INTERNAL MEDICINE

## 2017-04-04 PROCEDURE — 99218 HC RM OBSERVATION: CPT

## 2017-04-04 PROCEDURE — 82550 ASSAY OF CK (CPK): CPT | Performed by: INTERNAL MEDICINE

## 2017-04-04 RX ORDER — LISINOPRIL 10 MG/1
10 TABLET ORAL DAILY
Status: DISCONTINUED | OUTPATIENT
Start: 2017-04-04 | End: 2017-04-04 | Stop reason: HOSPADM

## 2017-04-04 RX ORDER — LISINOPRIL 10 MG/1
10 TABLET ORAL DAILY
Qty: 30 TAB | Refills: 0 | Status: SHIPPED | OUTPATIENT
Start: 2017-04-04 | End: 2020-12-17

## 2017-04-04 RX ORDER — METHYLPREDNISOLONE 4 MG/1
TABLET ORAL
Qty: 1 DOSE PACK | Refills: 0 | Status: SHIPPED | OUTPATIENT
Start: 2017-04-04 | End: 2020-12-17

## 2017-04-04 RX ADMIN — LISINOPRIL 10 MG: 10 TABLET ORAL at 09:12

## 2017-04-04 RX ADMIN — HYDRALAZINE HYDROCHLORIDE 25 MG: 25 TABLET, FILM COATED ORAL at 00:28

## 2017-04-04 RX ADMIN — Medication 1 CAPSULE: at 09:13

## 2017-04-04 NOTE — PROGRESS NOTES
Discharge instructions reviewed with patient and S.O.  Many questions asked and answered. Patient requested we make follow up appointment  for Dr. Bryan Archibald for her. Patient given prescriptions for Medrol dose pack, Lisinopril and outpatient PT. Discharged home via [facility] an personal belongings.

## 2017-04-04 NOTE — PROGRESS NOTES
[address] April 4, 2017       RE: Debra Lange      To Whom It May Concern,    This is to certify that Debra Lange was in the hospital from 4/3/2017 to 4/4/2017. We do not recommend the patient travel until cleared by her primary care provider. She may resume other work responsibilities tomorrow 4/5/2017. Please feel free to contact my office if you have any questions or concerns. Thank you for your assistance in this matter.       Sincerely,      Maksim Scott, EWA   [phone]

## 2017-04-04 NOTE — CONSULTS
Neurology Progress Note    Patient ID:  Chepe Tavarez  [id number]  34 y.o.  1985    Chief Complaint: Numbness and weakness    Subjective:     Percy Arevalo is a 23-year-old woman who presented with non-localizing left arm numbness and weakness and also some face and leg involvement. MRI brain was done which is unrevealing. C-spine was done as well and shows some degenerative disease and lateral recess effacement at C6-7. She does not have any neck pain. Objective:       ROS:  Per HPI  All other 12 pt ROS negative    Meds:  Current Facility-Administered Medications   Medication Dose Route Frequency    lisinopril (PRINIVIL, ZESTRIL) tablet 10 mg  10 mg Oral DAILY    acetaminophen (TYLENOL) tablet 650 mg  650 mg Oral Q4H PRN    ondansetron (ZOFRAN) injection 4 mg  4 mg IntraVENous Q4H PRN    docusate sodium (COLACE) capsule 100 mg  100 mg Oral BID    enoxaparin (LOVENOX) injection 40 mg  40 mg SubCUTAneous Q24H    lactobac ac& pc-s.therm-b.anim (MARYSOL Q/RISAQUAD)  1 Cap Oral DAILY       MRI Results (maximum last 3): Results from [place] encounter on 04/03/17   MRI SHOULDER LT WO CONT   Narrative EXAM:  MRI SHOULDER LT WO CONT    INDICATION: Acute onset left upper extremity weakness. No recent trauma    COMPARISON: June 6, 2014    TECHNIQUE: Axial proton density fat-saturated; oblique coronal T1, T2  fat-saturated, and proton density fat-saturated; and oblique sagittal T2  fat-saturated MRI of the left shoulder . CONTRAST: None. FINDINGS: A.C. joint: Normal for age. Anterior acromion process type: 2    Bone marrow: Fracture of the greater tuberosity has healed No acute fracture,  dislocation, or marrow replacing process. Joint fluid: None. Rotator cuff tendons: Intact. Biceps tendon: Intact and located within the bicipital groove. Muscles: No edema or atrophy. Glenoid labrum: Intact. Joint capsule: within normal limits. Glenohumeral articular cartilage: Intact.  No focal osteochondral lesion. Soft tissue mass: None. Impression IMPRESSION:      No evidence of internal derangement      MRI CERV SPINE W WO CONT   Narrative *PRELIMINARY REPORT*    Disc bulges at C5-6 and C6-7, but no significant central canal stenosis    Preliminary report was provided by Dr. Yehuda Echeverria, the on-call radiologist, at 2126  hours    Final report to follow. *END PRELIMINARY REPORT*    Study: MRI CERV SPINE W WO CONT    Indication:  32F with LUE weakness, leg and face. Need brain and c/s with amanda    Additional clinical history:    Comparison:  none available. Contrast: 7.5 Gadavist gadolinium contrast administered intravenously. Technique:  Magnetic resonance images of the cervical spine were obtained. The  following sequences were obtained: Sagittal T1, T2, T2 STIR; axial T1 and T2;  sagittal and axial T1 postcontrast.    Findings:  Alignment is anatomic without subluxation. Vertebral body and disc space heights  are maintained. The craniocervical junction is intact. Normal signal is seen in  the visualized osseous structures. The spinal cord is normal caliber and signal.  No concerning signal changes are seen in the visualized paraspinal soft tissues. Postcontrast imaging demonstrates no abnormal enhancement. C2-C3: No neuroforaminal narrowing or spinal canal stenosis. C3-C4: No neuroforaminal narrowing or spinal canal stenosis. C4-C5: No neuroforaminal narrowing or spinal canal stenosis. C5-C6: Minimal disc bulge with superimposed small right paracentral/foraminal  disc protrusion. Mild/moderate right neuroforaminal narrowing. No significant  spinal canal stenosis. C6-C7: Broad-based small to moderate-sized central/left paracentral disc  protrusion. The left lateral recess is partially effaced. No significant  neuroforaminal narrowing. Mild spinal canal stenosis. C7-T1: No neuroforaminal narrowing or spinal canal stenosis.          Impression Impression:  Disc bulges/protrusions at C5-C6 and C6-C7 described above. Mild/moderate right  neuroforaminal narrowing at C5-C6. Mild spinal canal stenosis at C6-C7. No  abnormal signal or enhancement in the spinal cord. MRI BRAIN W WO CONT   Narrative *PRELIMINARY REPORT*    No acute stroke    Preliminary report was provided by Dr. Maine Hamilton, the on-call radiologist, at 2124  hours    Final report to follow. *END PRELIMINARY REPORT*    Study: MRI BRAIN W WO CONT    Indication:  32F with LUE weakness, leg and face. Need brain and c/s with amanda    Additional clinical history:    Comparison: CT 4/3/2017. Contrast:  7.5  mL Gadavist gadolinium contrast  administered intravenously. Technique: The following sequences were obtained: Axial T1, T2, T2 FLAIR,  gradient echo; sagittal T1;   axial and coronal postcontrast T1;  diffusion-weighted imaging. Findings:  The ventricles and sulci are normal in appearance. There is no mass effect or  midline shift. There is no intracranial hemorrhage. A small area of focal  atrophy associated with T2 hyperintense signal is seen in the left posterior  cerebellum, likely related to remote infarct. No other signal changes are seen  in the brain. Diffusion-weighted imaging demonstrates no evidence of diffusion restriction. Postcontrast imaging demonstrates no abnormal enhancement. There are normal appearing vascular flow voids. The orbits are intact. Patchy mucosal thickening is seen and the paranasal  sinuses and ethmoid air cells. No air-fluid levels to to suggest acute  sinusitis. Impression Impression:  Focal T2 hyperintense signal in the left posterior cerebellum associated with  minimal atrophy suggestive of remote ischemic insult. No acute findings.                 Lab Review   Recent Results (from the past 24 hour(s))   PHOSPHORUS    Collection Time: 04/04/17  5:22 AM   Result Value Ref Range    Phosphorus 2.6 2.6 - 4.7 MG/DL   MAGNESIUM    Collection Time: 04/04/17  5:22 AM   Result Value Ref Range    Magnesium 1.9 1.6 - 2.4 mg/dL   CK W/ CKMB & INDEX    Collection Time: 04/04/17  5:22 AM   Result Value Ref Range    CK 59 26 - 192 U/L    CK - MB <1.0 <3.6 NG/ML    CK-MB Index Cannot be calulated 0 - 2.5     TROPONIN I    Collection Time: 04/04/17  5:22 AM   Result Value Ref Range    Troponin-I, Qt. <0.04 <0.05 ng/mL   TSH 3RD GENERATION    Collection Time: 04/04/17  5:22 AM   Result Value Ref Range    TSH 1.40 0.36 - 3.74 uIU/mL       Additional comments:I reviewed the patients new imaging test results. Patient Vitals for the past 8 hrs:   BP Temp Pulse Resp SpO2   04/04/17 0906 (!) 152/93 - 72 - -   04/04/17 0859 (!) 172/103 98.5 °F (36.9 °C) 68 14 97 %   04/04/17 0520 (!) 148/110 98.7 °F (37.1 °C) 89 16 98 %               Exam:  Visit Vitals    BP (!) 152/93 (BP 1 Location: Left arm, BP Patient Position: At rest)    Pulse 72    Temp 98.5 °F (36.9 °C)    Resp 14    Ht 5' 6\" (1.676 m)    Wt 80.9 kg (178 lb 6 oz)    SpO2 97%    Breastfeeding No    BMI 28.79 kg/m2     Gen: Well developed  CV: RRR  Lungs: non labored breathing  Abd: soft, non distended  Neuro: A&O x 3, no dysarthria or aphasia  CN II-XII: PERRL, EOMI, face symmetric, tongue/palate midline  Motor: strength 5/5 all four ext  Sensory: intact to LT  DTRs: symmetric  COOR: no limb/truncal ataxia  Gait: normal    PROBLEM LIST:     Patient Active Problem List   Diagnosis Code    Weakness of left upper extremity R29.898    Numbness and tingling of left side of face R20.0, R20.2    Essential hypertension I10       Assessment/Plan:      28-year-old woman who presented with left-sided symptoms. Imaging does not reveal an acute stroke or MS. Report suggests brain may have an old infarct. This is nonspecific and has no bearing on her presentation. I discussed the C-spine results with her. She does have degenerative disc disease. Possible pinched nerve.   I think this can be followed up as an outpatient. Overall she feels like she is improving since admission. We discussed stroke risk factors and management. She does have hypertension. Okay to discharge from neuro perspective. During this evaluation, we also discussed stroke education to include signs and symptoms of stroke and TIA.        Signed:  [address] [facility], DO  4/4/2017  9:56 AM

## 2017-04-04 NOTE — PROGRESS NOTES
Cm informed of plans for discharge today, no CM needs identified at this time.   [employer] , [place]

## 2017-04-04 NOTE — DISCHARGE INSTRUCTIONS
Discharge Instructions       PATIENT ID: Ivelisse Camacho  MRN: [id number]   YOB: 1985    DATE OF ADMISSION: 4/3/2017 12:05 AM    DATE OF DISCHARGE: 4/4/2017    PRIMARY CARE PROVIDER: Luly Mejias MD     ATTENDING PHYSICIAN: Caren Rey MD  DISCHARGING PROVIDER: Marilynn Abdul NP    To contact this individual call [phone] and ask the  to page. If unavailable ask to be transferred the Adult Hospitalist Department. DISCHARGE DIAGNOSES Left arm weakness, High blood pressure    CONSULTATIONS: IP CONSULT TO HOSPITALIST  IP CONSULT TO NEUROLOGY    PROCEDURES/SURGERIES: * No surgery found *    PENDING TEST RESULTS:   At the time of discharge the following test results are still pending: none    FOLLOW UP APPOINTMENTS:   Follow-up Information     Follow up With Details Comments Contact Info    Liat Warner MD  Call to schedule an appointment in 2-3 weeks [address] [facility] [address] [facility]      Luly Mejias MD In 1 week Hospital follow up [address] [place] [address] 231  [phone]           ADDITIONAL CARE RECOMMENDATIONS:   1. We would like you to follow up with Dr Alexandra Reece with neurosurgery for your left arm numbness and spinal stenosis. See above for her contract information. 2. Your blood pressure was elevated during your admission, so we have start you on a blood pressure medication called Lisinopril. See below for more information on the medication to include common side effects. Make sure to follow up with your primary care provider for further blood pressure management. Make sure to check your blood pressure twice a day at home and record it. Take this record with you to your follow up with your primary care provider. 3. We have also prescribe you a steroid dose pack for your left arm numbness. Also recommending you start outpatient physical therapy.  We have provided you with a script that you can take to a physical therapy center of your choice. DIET: cardiac, low salt diet    ACTIVITY: as tolerated    WOUND CARE: none    EQUIPMENT needed: none      DISCHARGE MEDICATIONS:  Prednisone (By mouth)   Prednisone (PRED-ni-sone)  Treats many diseases and conditions, especially problems related to inflammation. This medicine is a corticosteroid. Brand Name(s):Deltasone, Prednicot, Gideon, Sterapred DS, predniSONE Intensol   There may be other brand names for this medicine. When This Medicine Should Not Be Used: This medicine is not right for everyone. Do not use if you had an allergic reaction to prednisone or if you are pregnant. How to Use This Medicine:   Liquid, Tablet, Delayed Release Tablet  · Take your medicine as directed. Your dose may need to be changed several times to find what works best for you. · It is best to take this medicine with food or milk. · Swallow the delayed-release tablet whole. Do not crush, break, or chew it. · Measure the oral liquid medicine with a marked measuring spoon, oral syringe, or medicine cup. · Missed dose: Take a dose as soon as you remember. If it is almost time for your next dose, wait until then and take a regular dose. Do not take extra medicine to make up for a missed dose. · Store the medicine in a closed container at room temperature, away from heat, moisture, and direct light. Do not freeze the oral liquid. Drugs and Foods to Avoid:   Ask your doctor or pharmacist before using any other medicine, including over-the-counter medicines, vitamins, and herbal products.   · Tell your doctor if you use any of the following:  ¨ Aminoglutethimide, amphotericin B, carbamazepine, cholestyramine, cyclosporine, digoxin, isoniazid, ketoconazole, phenobarbital, phenytoin, or rifampin  ¨ Blood thinner, such as warfarin  ¨ NSAID pain or arthritis medicine, such as aspirin, diclofenac, ibuprofen, naproxen, celecoxib  ¨ Diuretic (water pill)  ¨ Diabetes medicine  ¨ Macrolide antibiotic, such as azithromycin, clarithromycin, erythromycin  ¨ Estrogen, including birth control pills or hormone replacement therapy  · This medicine may interfere with vaccines. Ask your doctor before you get a flu shot or any other vaccines. Warnings While Using This Medicine:   · It is not safe to take this medicine during pregnancy. It could harm an unborn baby. Tell your doctor right away if you become pregnant. · Tell your doctor if you are breastfeeding or if you have kidney problems, heart failure, high blood pressure, a recent heart attack, diabetes, glaucoma, osteoporosis, or thyroid problems. Tell your doctor about any infection you have. Also tell your doctor if you have had mental or emotional problems (such as depression) or stomach or bowel problems (such as an ulcer or diverticulitis). · This medicine may cause the following problems:  ¨ Mood or behavior changes  ¨ Higher blood pressure, retaining water, changes in salt or potassium levels in your body  ¨ Cataracts or glaucoma (with long-term use)  ¨ Weak bones or osteoporosis (with long-term use)  ¨ Slow growth in children (with long-term use)  ¨ Muscle problems (with high doses, especially if you have myasthenia gravis or similar nerve and muscle problems)  · Do not stop using this medicine suddenly. Your doctor will need to slowly decrease your dose before you stop it completely. · This medicine could cause you to get infections more easily. Tell your doctor right away if you are exposed to chicken pox, measles, or other serious infection. Tell your doctor if you had a serious infection in the past, such as tuberculosis or herpes. · Tell your doctor about any extra stress or anxiety in your life. Your dose might need to be changed for a short time. · Tell any doctor or dentist who treats you that you are using this medicine. This medicine may affect certain medical test results. · Keep all medicine out of the reach of children.  Never share your medicine with anyone. Possible Side Effects While Using This Medicine:   Call your doctor right away if you notice any of these side effects:  · Allergic reaction: Itching or hives, swelling in your face or hands, swelling or tingling in your mouth or throat, chest tightness, trouble breathing  · Dark freckles, skin color changes, coldness, weakness, tiredness, nausea, vomiting, weight loss  · Depression, unusual thoughts, feelings, or behaviors, trouble sleeping  · Fever, chills, cough, sore throat, and body aches  · Muscle pain or weakness  · Rapid weight gain, swelling in your hands, ankles, or feet  · Severe stomach pain, nausea, vomiting, or red or black stools  · Skin changes or growths  · Trouble seeing, eye pain, headache  If you notice these less serious side effects, talk with your doctor:   · Increased appetite  · Round, puffy face  · Weight gain around your neck, upper back, breast, face, or waist  If you notice other side effects that you think are caused by this medicine, tell your doctor. Call your doctor for medical advice about side effects. You may report side effects to FDA at 3-098-FDA-8934  © 2016 [address] is for End User's use only and may not be sold, redistributed or otherwise used for commercial purposes. The above information is an  only. It is not intended as medical advice for individual conditions or treatments. Talk to your doctor, nurse or pharmacist before following any medical regimen to see if it is safe and effective for you. Lisinopril (By mouth)   Lisinopril (lye-SIN-oh-pril)  Treats high blood pressure and heart failure. Also given to reduce the risk of death after a heart attack. This medicine is an ACE inhibitor. Brand Name(s):Prinivil, Zestril   There may be other brand names for this medicine. When This Medicine Should Not Be Used: This medicine is not right for everyone.  Do not use it if you had an allergic reaction to lisinopril or another ACE inhibitor, or if you are pregnant. How to Use This Medicine:   Tablet  · Take your medicine as directed. Your dose may need to be changed several times to find what works best for you. · Missed dose: Take a dose as soon as you remember. If it is almost time for your next dose, wait until then and take a regular dose. Do not take extra medicine to make up for a missed dose. · Store the medicine in a closed container at room temperature, away from heat, moisture, and direct light. Drugs and Foods to Avoid:   Ask your doctor or pharmacist before using any other medicine, including over-the-counter medicines, vitamins, and herbal products. · Do not use this medicine together with aliskiren if you have diabetes. · Some foods and medicines may affect how lisinopril works. Tell your doctor if you are using any of the following:   ¨ Aliskiren, everolimus, lithium, sirolimus, temsirolimus  ¨ Another blood pressure medicine, including an angiotensin receptor blocker (ARB)  ¨ Insulin or diabetes medicine  ¨ Diuretic (water pills, including amiloride, spironolactone, triamterene)  ¨ NSAID pain or arthritis medicine (including aspirin, celecoxib, diclofenac, ibuprofen, naproxen)  · Ask your doctor before you use any medicine, supplement, or salt substitute that contains potassium. Warnings While Using This Medicine:   · It is not safe to take this medicine during pregnancy. It could harm an unborn baby. Tell your doctor right away if you become pregnant. · Tell your doctor if you are breastfeeding, or if you have kidney disease, liver disease, diabetes, or heart or blood vessel disease. · This medicine may cause the following problems:  ¨ Angioedema (severe swelling)  ¨ Kidney problems  ¨ Serious liver problems  · This medicine could lower your blood pressure too much, especially when you first use it or if you are dehydrated. Stand or sit up slowly if you feel lightheaded or dizzy.   · Do not stop using this medicine without asking your doctor, even if you feel well. This medicine will not cure your high blood pressure, but it will help keep it in a normal range. You may have to take blood pressure medicine for the rest of your life. · Tell any doctor or dentist who treats you that you are using this medicine. · Your doctor will do lab tests at regular visits to check on the effects of this medicine. Keep all appointments. · Keep all medicine out of the reach of children. Never share your medicine with anyone. Possible Side Effects While Using This Medicine:   Call your doctor right away if you notice any of these side effects:  · Allergic reaction: Itching or hives, swelling in your face or hands, swelling or tingling in your mouth or throat, chest tightness, trouble breathing  · Blistering, peeling, or red skin rash  · Change in how much or how often you urinate  · Confusion, weakness, uneven heartbeat, trouble breathing, numbness or tingling in your hands, feet, or lips  · Dark urine or pale stools, nausea, vomiting, loss of appetite, stomach pain, yellow skin or eyes  · Fever, chills, sore throat, body aches  · Lightheadedness, dizziness, fainting  · Severe stomach pain (with or without nausea or vomiting)  If you notice these less serious side effects, talk with your doctor:   · Dry cough  If you notice other side effects that you think are caused by this medicine, tell your doctor. Call your doctor for medical advice about side effects. You may report side effects to FDA at 4-867-FDA-3438  © 2016 [address] is for End User's use only and may not be sold, redistributed or otherwise used for commercial purposes. The above information is an  only. It is not intended as medical advice for individual conditions or treatments. Talk to your doctor, nurse or pharmacist before following any medical regimen to see if it is safe and effective for you.      See Medication Reconciliation Form    · It is important that you take the medication exactly as they are prescribed. · Keep your medication in the bottles provided by the pharmacist and keep a list of the medication names, dosages, and times to be taken in your wallet. · Do not take other medications without consulting your doctor. NOTIFY YOUR PHYSICIAN FOR ANY OF THE FOLLOWING:   Fever over 101 degrees for 24 hours. Chest pain, shortness of breath, fever, chills, nausea, vomiting, diarrhea, change in mentation, falling, weakness, bleeding. Severe pain or pain not relieved by medications. Or, any other signs or symptoms that you may have questions about. DISPOSITION:  X  Home With:   OT  PT  HH  RN       SNF/Inpatient Rehab/LTAC    Independent/assisted living    Hospice    Other:       PROBLEM LIST Updated: Yes X       Signed:   Mariela Bruno NP  4/4/2017  8:59 AM           Learning About High Blood Pressure  What is high blood pressure? Blood pressure is a measure of how hard the blood pushes against the walls of your arteries. It's normal for blood pressure to go up and down throughout the day, but if it stays up, you have high blood pressure. Another name for high blood pressure is hypertension. Two numbers tell you your blood pressure. The first number is the systolic pressure. It shows how hard the blood pushes when your heart is pumping. The second number is the diastolic pressure. It shows how hard the blood pushes between heartbeats, when your heart is relaxed and filling with blood. A blood pressure of less than 120/80 (say \"120 over 80\") is ideal for an adult. High blood pressure is 140/90 or higher. You have high blood pressure if your top number is 140 or higher or your bottom number is 90 or higher, or both. Many people fall into the category in between, called prehypertension.  People with prehypertension need to make lifestyle changes to bring their blood pressure down and help prevent or delay high blood pressure. What happens when you have high blood pressure? · Blood flows through your arteries with too much force. Over time, this damages the walls of your arteries. But you can't feel it. High blood pressure usually doesn't cause symptoms. · Fat and calcium start to build up in your arteries. This buildup is called plaque. Plaque makes your arteries narrower and stiffer. Blood can't flow through them as easily. · This lack of good blood flow starts to damage some of the organs in your body. This can lead to problems such as coronary artery disease and heart attack, heart failure, stroke, kidney failure, and eye damage. How can you prevent high blood pressure? · Stay at a healthy weight. · Try to limit how much sodium you eat to less than 2,300 milligrams (mg) a day. If you limit your sodium to 1,500 mg a day, you can lower your blood pressure even more. ¨ Buy foods that are labeled \"unsalted,\" \"sodium-free,\" or \"low-sodium. \" Foods labeled \"reduced-sodium\" and \"light sodium\" may still have too much sodium. ¨ Flavor your food with garlic, lemon juice, onion, vinegar, herbs, and spices instead of salt. Do not use soy sauce, steak sauce, onion salt, garlic salt, mustard, or ketchup on your food. ¨ Use less salt (or none) when recipes call for it. You can often use half the salt a recipe calls for without losing flavor. · Be physically active. Get at least 30 minutes of exercise on most days of the week. Walking is a good choice. You also may want to do other activities, such as running, swimming, cycling, or playing tennis or team sports. · Limit alcohol to 2 drinks a day for men and 1 drink a day for women. · Eat plenty of fruits, vegetables, and low-fat dairy products. Eat less saturated and total fats. How is high blood pressure treated? · Your doctor will suggest making lifestyle changes.  For example, your doctor may ask you to eat healthy foods, quit smoking, lose extra weight, and be more active. · If lifestyle changes don't help enough or your blood pressure is very high, you will have to take medicine every day. Follow-up care is a key part of your treatment and safety. Be sure to make and go to all appointments, and call your doctor if you are having problems. It's also a good idea to know your test results and keep a list of the medicines you take. Where can you learn more? Go to http://kirk-tj.info/. Enter P501 in the search box to learn more about \"Learning About High Blood Pressure. \"  Current as of: 2016  Content Version: 11.2  © 1454-3961 [employer]. Care instructions adapted under license by [employer] (which disclaims liability or warranty for this information). If you have questions about a medical condition or this instruction, always ask your healthcare professional. [place] [address] any warranty or liability for your use of this information. [employer] Activation    Thank you for requesting access to [employer]. Please follow the instructions below to securely access and download your online medical record. [employer] allows you to send messages to your doctor, view your test results, renew your prescriptions, schedule appointments, and more. How Do I Sign Up? 1. In your internet browser, go to [email or web address]  2. Click on the First Time User? Click Here link in the Sign In box. You will be redirect to the New Member Sign Up page. 3. Enter your [employer] Access Code exactly as it appears below. You will not need to use this code after youve completed the sign-up process. If you do not sign up before the expiration date, you must request a new code. [employer] Access Code: [email or web address]  Expires: 2017 11:51 PM (This is the date your [employer] access code will )    4.  Enter the last four digits of your Social Security Number (xxxx) and Date of Birth (mm/dd/yyyy) as indicated and click Submit. You will be taken to the next sign-up page. 5. Create a [employer] ID. This will be your [employer] login ID and cannot be changed, so think of one that is secure and easy to remember. 6. Create a [employer] password. You can change your password at any time. 7. Enter your Password Reset Question and Answer. This can be used at a later time if you forget your password. 8. Enter your e-mail address. You will receive e-mail notification when new information is available in [address]. 9. Click Sign Up. You can now view and download portions of your medical record. 10. Click the Download Summary menu link to download a portable copy of your medical information. Additional Information    If you have questions, please visit the Frequently Asked Questions section of the [employer] website at [email or web address] Remember, [employer] is NOT to be used for urgent needs. For medical emergencies, dial 911.
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
## 2017-04-14 ENCOUNTER — OFFICE VISIT (OUTPATIENT)
Dept: NEUROLOGY | Age: 32
End: 2017-04-14

## 2017-04-14 VITALS
HEART RATE: 71 BPM | BODY MASS INDEX: 26.95 KG/M2 | SYSTOLIC BLOOD PRESSURE: 102 MMHG | OXYGEN SATURATION: 98 % | DIASTOLIC BLOOD PRESSURE: 60 MMHG | WEIGHT: 167 LBS | RESPIRATION RATE: 18 BRPM

## 2017-04-14 DIAGNOSIS — M50.20 HERNIATED CERVICAL DISC: ICD-10-CM

## 2017-04-14 DIAGNOSIS — M54.12 CERVICAL RADICULOPATHY AT C6: Primary | ICD-10-CM

## 2017-04-14 NOTE — PATIENT INSTRUCTIONS
[address] [facility]   Statement to Patients  April 1, 2014      In an effort to ensure the large volume of patient prescription refills is processed in the most efficient and expeditious manner, we are asking our patients to assist us by calling your Pharmacy for all prescription refills, this will include also your  Mail Order Pharmacy. The pharmacy will contact our office electronically to continue the refill process. Please do not wait until the last minute to call your pharmacy. We need at least 48 hours (2days) to fill prescriptions. We also encourage you to call your pharmacy before going to  your prescription to make sure it is ready. With regard to controlled substance prescription refill requests (narcotic refills) that need to be picked up at our office, we ask your cooperation by providing us with at least 72 hours (3days) notice that you will need a refill. We will not refill narcotic prescription refill requests after 4:00pm on any weekday, Monday through Thursday, or after 2:00pm on Fridays, or on the weekends. We encourage everyone to explore another way of getting your prescription refill request processed using [employer], our patient web portal through our electronic medical record system. [employer] is an efficient and effective way to communicate your medication request directly to the office and  downloadable as an tl on your smart phone . [employer] also features a review functionality that allows you to view your medication list as well as leave messages for your physician. Are you ready to get connected? If so please review the attatched instructions or speak to any of our staff to get you set up right away! Thank you so much for your cooperation. Should you have any questions please contact our Practice Administrator.     The Physicians and Staff,  [facility]     Thank you for choosing [facility] for your     care. You may receive a survey about your visit. We appreciate you taking time     to complete this survey as we use your feedback to improve our services. We     realize we are not perfect, but we strive to provide excellent care. Pinched Nerve in the Neck: Care Instructions  Your Care Instructions  A pinched nerve in the neck happens when a vertebra or disc in the upper part of your spine is damaged. This damage can happen because of an injury. Or it can just happen with age. The changes caused by the damage may put pressure on a nearby nerve root, pinching it. This causes symptoms such as sharp pain in your neck, shoulder, arm, or back. You may also have tingling or numbness. Sometimes it makes your arm weaker. The symptoms are usually worse when you turn your head or strain your neck. For many people, the symptoms get better over time and finally go away. Early treatment usually includes medicines for pain and swelling. Sometimes physical therapy and special exercises may help. Follow-up care is a key part of your treatment and safety. Be sure to make and go to all appointments, and call your doctor if you are having problems. It's also a good idea to know your test results and keep a list of the medicines you take. How can you care for yourself at home? · Be safe with medicines. Read and follow all instructions on the label. ¨ If the doctor gave you a prescription medicine for pain, take it as prescribed. ¨ If you are not taking a prescription pain medicine, ask your doctor if you can take an over-the-counter medicine. · Try using a heating pad on a low or medium setting for 15 to 20 minutes every 2 or 3 hours. Try a warm shower in place of one session with the heating pad. You can also buy single-use heat wraps that last up to 8 hours. · You can also try an ice pack for 10 to 15 minutes every 2 to 3 hours.  There isn't strong evidence that either heat or ice will help. But you can try them to see if they help you. · Don't spend too long in one position. Take short breaks to move around and change positions. · Wear a seat belt and shoulder harness when you are in a car. · Sleep with a pillow under your head and neck that keeps your neck straight. · If you were given a neck brace (cervical collar) to limit neck motion, wear it as instructed for as many days as your doctor tells you to. Do not wear it longer than you were told to. Wearing a brace for too long can lead to neck stiffness and can weaken the neck muscles. · Follow your doctor's instructions for gentle neck-stretching exercises. · Do not smoke. Smoking can slow healing of your discs. If you need help quitting, talk to your doctor about stop-smoking programs and medicines. These can increase your chances of quitting for good. · Avoid strenuous work or exercise until your doctor says it is okay. When should you call for help? Call 911 anytime you think you may need emergency care. For example, call if:  · You are unable to move an arm or a leg at all. Call your doctor now or seek immediate medical care if:  · You have new or worse symptoms in your arms, legs, chest, belly, or buttocks. Symptoms may include:  ¨ Numbness or tingling. ¨ Weakness. ¨ Pain. · You lose bladder or bowel control. Watch closely for changes in your health, and be sure to contact your doctor if:  · You are not getting better as expected. Where can you learn more? Go to http://kirk-tj.info/. Enter F557 in the search box to learn more about \"Pinched Nerve in the Neck: Care Instructions. \"  Current as of: May 23, 2016  Content Version: 11.2  © 8605-8760 [employer]. Care instructions adapted under license by [employer] (which disclaims liability or warranty for this information).  If you have questions about a medical condition or this instruction, always ask your healthcare professional. [address] any warranty or liability for your use of this information.

## 2017-04-14 NOTE — LETTER
4/14/2017 Patient:  Demian Donald YOB: 1985 Date of Visit: 4/14/2017 Dear Kristy Borrego MD 
[facility] [address] VIA Facsimile: [phone] 
 : I was requested by Kristy Borrego MD to evaluate Ms. Demian Donald  for Chief Complaint Patient presents with  Extremity Weakness Trav Thornton I am recommending the following:  
 
Trina Deluna was seen today for extremity weakness. Diagnoses and all orders for this visit: 
 
Cervical radiculopathy at C6 
-     REFERRAL TO PHYSICAL THERAPY Herniated cervical disc 
 
 
 
---------------------------------------------------------------------------------------------------------------------- Below is my encounter: Chief Complaint Patient presents with  Extremity Weakness HPI Trina Deluna is a 34-year-old woman who was in the hospital a few weeks ago for left arm numbness and weakness. She had just returned from a long [place]. She was admitted out of suspicion for stroke. MRI brain was completely normal.  MRI of the cervical spine was done looking for any evidence to suggest transverse myelitis. It revealed instead herniated disc and neuroforaminal narrowing. Since her admission she does have some improvement. The arm still feels globally numb but less so. Having some slight discomfort on the dorsal hand. She went to see neurosurgery and was deemed not requiring any intervention. She is also taking new blood pressure medicine since her admission and feels somewhat uncomfortable with the medicine. Review of Systems Neurological: Positive for sensory change. All other systems reviewed and are negative. Past Medical History:  
Diagnosis Date  Bell's palsy  H/O seasonal allergies  Polycystic ovary History reviewed. No pertinent family history. Social History Social History  Marital status: SINGLE   Spouse name: N/A  
 Number of children: N/A  
  Years of education: N/A Occupational History  Not on file. Social History Main Topics  Smoking status: Former Smoker  Smokeless tobacco: Not on file  Alcohol use Yes Comment: socially  Drug use: No  
 Sexual activity: Not on file Other Topics Concern  Not on file Social History Narrative Allergies Allergen Reactions  Codeine Nausea Only  Sulfa (Sulfonamide Antibiotics) Hives and Swelling  Tetracycline Hives Current Outpatient Prescriptions Medication Sig  METFORMIN HCL (METFORMIN PO) Take  by mouth.  lisinopril (PRINIVIL, ZESTRIL) 10 mg tablet Take 1 Tab by mouth daily. Indications: hypertension Conchita Dia, Outpatient physical therapy to evaluate and treat.  methyphenidate ER 27 mg 24 hr tab Take 27 mg by mouth every morning.  methylPREDNISolone (MEDROL DOSEPACK) 4 mg tablet Per dose pack instructions No current facility-administered medications for this visit. Neurologic Exam  
 
Mental Status WD/WN adult in NAD, normal grooming VSS 
A&O x 3 PERRL, nonicteric Face is symmetric, tongue midline Speech is fluent and clear No limb ataxia. No abnl movements. Moving all extemities spontaneously and symmetric Normal gait  
 
slightly depressed reflexes left Brachial radialis CVS RRR Lungs nonlabored Skin is warm and dry Visit Vitals  /60  Pulse 71  Resp 18  Wt 75.8 kg (167 lb)  LMP 02/18/2017 (Exact Date)  SpO2 98%  BMI 26.95 kg/m2 Assessment and Plan Joi Escobar was seen today for extremity weakness. Diagnoses and all orders for this visit: 
 
Cervical radiculopathy at C6 
-     REFERRAL TO PHYSICAL THERAPY Herniated cervical disc 65-year-old woman who was originally admitted for possible stroke in the setting of left arm sensory and motor changes.   She does have an abnormal C-spine showing herniated disc at C6-7 and some spinal stenosis in the same location. I do think this likely accounts for her presentation albeit atypical.  Her brain does not show any signs to suggest demyelinating disease or other acute process. It is possible that her long trip overseas could have irritated her C-spine. I am going to send her to physical therapy. I discussed with her that if she feels acute worsening she needs to be reevaluated by a spine specialist. 
 
 
Thank you for giving me the opportunity to assist in the care of Ms. Josef Hilliard. If you have questions, please do not hesitate to contact me. Sincerely, [address] [facility], DO Neurologist 
Diplomate ABPN

## 2017-04-14 NOTE — PROGRESS NOTES
Chief Complaint   Patient presents with    Extremity Weakness       HPI    Emani Fisher is a 42-year-old woman who was in the hospital a few weeks ago for left arm numbness and weakness. She had just returned from a long [place] trip. She was admitted out of suspicion for stroke. MRI brain was completely normal.  MRI of the cervical spine was done looking for any evidence to suggest transverse myelitis. It revealed instead herniated disc and neuroforaminal narrowing. Since her admission she does have some improvement. The arm still feels globally numb but less so. Having some slight discomfort on the dorsal hand. She went to see neurosurgery and was deemed not requiring any intervention. She is also taking new blood pressure medicine since her admission and feels somewhat uncomfortable with the medicine. Review of Systems   Neurological: Positive for sensory change. All other systems reviewed and are negative. Past Medical History:   Diagnosis Date    Bell's palsy     H/O seasonal allergies     Polycystic ovary      History reviewed. No pertinent family history. Social History     Social History    Marital status: SINGLE     Spouse name: N/A    Number of children: N/A    Years of education: N/A     Occupational History    Not on file. Social History Main Topics    Smoking status: Former Smoker    Smokeless tobacco: Not on file    Alcohol use Yes      Comment: socially    Drug use: No    Sexual activity: Not on file     Other Topics Concern    Not on file     Social History Narrative     Allergies   Allergen Reactions    Codeine Nausea Only    Sulfa (Sulfonamide Antibiotics) Hives and Swelling    Tetracycline Hives         Current Outpatient Prescriptions   Medication Sig    METFORMIN HCL (METFORMIN PO) Take  by mouth.  lisinopril (PRINIVIL, ZESTRIL) 10 mg tablet Take 1 Tab by mouth daily.  Indications: hypertension   Mike Saunders, Outpatient physical therapy to evaluate and treat.  methyphenidate ER 27 mg 24 hr tab Take 27 mg by mouth every morning.  methylPREDNISolone (MEDROL DOSEPACK) 4 mg tablet Per dose pack instructions     No current facility-administered medications for this visit. Neurologic Exam     Mental Status        WD/WN adult in NAD, normal grooming  VSS  A&O x 3    PERRL, nonicteric  Face is symmetric, tongue midline  Speech is fluent and clear  No limb ataxia. No abnl movements. Moving all extemities spontaneously and symmetric  Normal gait     slightly depressed reflexes left  Brachial radialis    CVS RRR  Lungs nonlabored  Skin is warm and dry         Visit Vitals    /60    Pulse 71    Resp 18    Wt 75.8 kg (167 lb)    LMP 02/18/2017 (Exact Date)    SpO2 98%    BMI 26.95 kg/m2       Assessment and Plan   Fawda Casas was seen today for extremity weakness. Diagnoses and all orders for this visit:    Cervical radiculopathy at C6  -     REFERRAL TO PHYSICAL THERAPY    Herniated cervical disc      30-year-old woman who was originally admitted for possible stroke in the setting of left arm sensory and motor changes. She does have an abnormal C-spine showing herniated disc at C6-7 and some spinal stenosis in the same location. I do think this likely accounts for her presentation albeit atypical.  Her brain does not show any signs to suggest demyelinating disease or other acute process. It is possible that her long trip overseas could have irritated her C-spine. I am going to send her to physical therapy.   I discussed with her that if she feels acute worsening she needs to be reevaluated by a spine specialist.          [facility], [address] [facility] LINK

## 2017-04-14 NOTE — MR AVS SNAPSHOT
Visit Information Date & Time Provider Department Dept. Phone Encounter #  
 4/14/2017  8:00 AM Clenton Cinnamon Martin Rubinstein, DO [facility] at [address] [id number] Follow-up Instructions Return in about 6 months (around 10/14/2017), or if symptoms worsen or fail to improve. Upcoming Health Maintenance Date Due DTaP/Tdap/Td series (1 - Tdap) 3/13/2005 PAP AKA CERVICAL CYTOLOGY 3/13/2005 INFLUENZA AGE 9 TO ADULT 8/1/2016 Allergies as of 4/14/2017  Review Complete On: 4/14/2017 By: Frank Cruz LPN Severity Noted Reaction Type Reactions Codeine  04/02/2017    Nausea Only Sulfa (Sulfonamide Antibiotics)  04/02/2017    Hives, Swelling Tetracycline  04/02/2017    Hives Current Immunizations  Never Reviewed No immunizations on file. Not reviewed this visit You Were Diagnosed With   
  
 Codes Comments Cervical radiculopathy at C6    -  Primary ICD-10-CM: M54.12 
ICD-9-CM: 723.4 Herniated cervical disc     ICD-10-CM: M50.20 ICD-9-CM: 722.0 Vitals BP Pulse Resp Weight(growth percentile) LMP SpO2  
 102/60 71 18 167 lb (75.8 kg) 02/18/2017 (Exact Date) 98% BMI OB Status Smoking Status 26.95 kg/m2 Having regular periods Former Smoker Vitals History BMI and BSA Data Body Mass Index Body Surface Area  
 26.95 kg/m 2 1.88 m 2 Your Updated Medication List  
  
   
This list is accurate as of: 4/14/17  8:56 AM.  Always use your most recent med list.  
  
  
  
  
 CONCERTA 27 mg CR tablet Generic drug:  methylphenidate Take 27 mg by mouth every morning. lisinopril 10 mg tablet Commonly known as:  Walker Ku Take 1 Tab by mouth daily. Indications: hypertension METFORMIN PO Take  by mouth.  
  
 methylPREDNISolone 4 mg tablet Commonly known as:  Wei Kidd Per dose pack instructions OTHER(NON-FORMULARY) Outpatient physical therapy to evaluate and treat. We Performed the Following REFERRAL TO PHYSICAL THERAPY [TKK54 Custom] Comments:  
 Left neck radiculopathy Follow-up Instructions Return in about 6 months (around 10/14/2017), or if symptoms worsen or fail to improve. Referral Information Referral ID Referred By Referred To  
  
 [id number] Hussain WOODRUFF, PT, DPT   
   [address] C[address]Jaleel MOORE [place] Final Phone: [phone] Fax: [phone] Visits Status Start Date End Date 1 New Request 4/14/17 4/14/18 If your referral has a status of pending review or denied, additional information will be sent to support the outcome of this decision. Patient Instructions PRESCRIPTION REFILL POLICY [facility] Statement to Patients April 1, 2014 In an effort to ensure the large volume of patient prescription refills is processed in the most efficient and expeditious manner, we are asking our patients to assist us by calling your Pharmacy for all prescription refills, this will include also your  Mail Order Pharmacy. The pharmacy will contact our office electronically to continue the refill process. Please do not wait until the last minute to call your pharmacy. We need at least 48 hours (2days) to fill prescriptions. We also encourage you to call your pharmacy before going to  your prescription to make sure it is ready. With regard to controlled substance prescription refill requests (narcotic refills) that need to be picked up at our office, we ask your cooperation by providing us with at least 72 hours (3days) notice that you will need a refill. We will not refill narcotic prescription refill requests after 4:00pm on any weekday, Monday through Thursday, or after 2:00pm on Fridays, or on the weekends.   
  
We encourage everyone to explore another way of getting your prescription refill request processed using [employer], our patient web portal through our electronic medical record system. [employer] is an efficient and effective way to communicate your medication request directly to the office and  downloadable as an tl on your smart phone . [employer] also features a review functionality that allows you to view your medication list as well as leave messages for your physician. Are you ready to get connected? If so please review the attatched instructions or speak to any of our staff to get you set up right away! Thank you so much for your cooperation. Should you have any questions please contact our Practice Administrator. The Physicians and Staff,  [facility] Thank you for choosing [facility] for your  
 
care. You may receive a survey about your visit. We appreciate you taking time  
 
to complete this survey as we use your feedback to improve our services. We  
 
realize we are not perfect, but we strive to provide excellent care. Pinched Nerve in the Neck: Care Instructions Your Care Instructions A pinched nerve in the neck happens when a vertebra or disc in the upper part of your spine is damaged. This damage can happen because of an injury. Or it can just happen with age. The changes caused by the damage may put pressure on a nearby nerve root, pinching it. This causes symptoms such as sharp pain in your neck, shoulder, arm, or back. You may also have tingling or numbness. Sometimes it makes your arm weaker. The symptoms are usually worse when you turn your head or strain your neck. For many people, the symptoms get better over time and finally go away. Early treatment usually includes medicines for pain and swelling. Sometimes physical therapy and special exercises may help. Follow-up care is a key part of your treatment and safety.  Be sure to make and go to all appointments, and call your doctor if you are having problems. It's also a good idea to know your test results and keep a list of the medicines you take. How can you care for yourself at home? · Be safe with medicines. Read and follow all instructions on the label. ¨ If the doctor gave you a prescription medicine for pain, take it as prescribed. ¨ If you are not taking a prescription pain medicine, ask your doctor if you can take an over-the-counter medicine. · Try using a heating pad on a low or medium setting for 15 to 20 minutes every 2 or 3 hours. Try a warm shower in place of one session with the heating pad. You can also buy single-use heat wraps that last up to 8 hours. · You can also try an ice pack for 10 to 15 minutes every 2 to 3 hours. There isn't strong evidence that either heat or ice will help. But you can try them to see if they help you. · Don't spend too long in one position. Take short breaks to move around and change positions. · Wear a seat belt and shoulder harness when you are in a car. · Sleep with a pillow under your head and neck that keeps your neck straight. · If you were given a neck brace (cervical collar) to limit neck motion, wear it as instructed for as many days as your doctor tells you to. Do not wear it longer than you were told to. Wearing a brace for too long can lead to neck stiffness and can weaken the neck muscles. · Follow your doctor's instructions for gentle neck-stretching exercises. · Do not smoke. Smoking can slow healing of your discs. If you need help quitting, talk to your doctor about stop-smoking programs and medicines. These can increase your chances of quitting for good. · Avoid strenuous work or exercise until your doctor says it is okay. When should you call for help? Call 911 anytime you think you may need emergency care. For example, call if: 
· You are unable to move an arm or a leg at all. Call your doctor now or seek immediate medical care if: · You have new or worse symptoms in your arms, legs, chest, belly, or buttocks. Symptoms may include: ¨ Numbness or tingling. ¨ Weakness. ¨ Pain. · You lose bladder or bowel control. Watch closely for changes in your health, and be sure to contact your doctor if: 
· You are not getting better as expected. Where can you learn more? Go to http://kirk-tj.info/. Enter M649 in the search box to learn more about \"Pinched Nerve in the Neck: Care Instructions. \" Current as of: May 23, 2016 Content Version: 11.2 © 4308-0217 [employer]. Care instructions adapted under license by [employer] (which disclaims liability or warranty for this information). If you have questions about a medical condition or this instruction, always ask your healthcare professional. [address] any warranty or liability for your use of this information. Introducing [facility] [place] [facility]! [facility] introduces [employer] patient portal. Now you can access parts of your medical record, email your doctor's office, and request medication refills online. 1. In your internet browser, go to [email or web address] (WPP). [email or web address] (WPP) 2. Click on the First Time User? Click Here link in the Sign In box. You will see the New Member Sign Up page. 3. Enter your [employer] Access Code exactly as it appears below. You will not need to use this code after youve completed the sign-up process. If you do not sign up before the expiration date, you must request a new code. · [employer] Access Code: [email or web address] Expires: 7/1/2017 11:51 PM 
 
4. Enter the last four digits of your Social Security Number (xxxx) and Date of Birth (mm/dd/yyyy) as indicated and click Submit. You will be taken to the next sign-up page. 5. Create a [employer] ID. This will be your [employer] login ID and cannot be changed, so think of one that is secure and easy to remember. 6. Create a [employer] password. You can change your password at any time. 7. Enter your Password Reset Question and Answer. This can be used at a later time if you forget your password. 8. Enter your e-mail address. You will receive e-mail notification when new information is available in [address] 9. Click Sign Up. You can now view and download portions of your medical record. 10. Click the Download Summary menu link to download a portable copy of your medical information. If you have questions, please visit the Frequently Asked Questions section of the [employer] website. Remember, [employer] is NOT to be used for urgent needs. For medical emergencies, dial 911. Now available from your iPhone and Android! Please provide this summary of care documentation to your next provider. Your primary care clinician is listed as Ibeth Souza. If you have any questions after today's visit, please call [phone].

## 2017-04-14 NOTE — COMMUNICATION BODY
Chief Complaint   Patient presents with    Extremity Weakness       HPI    Madeline Sanchez is a 39-year-old woman who was in the hospital a few weeks ago for left arm numbness and weakness. She had just [place]. She was admitted out of suspicion for stroke. MRI brain was completely normal.  MRI of the cervical spine was done looking for any evidence to suggest transverse myelitis. It revealed instead herniated disc and neuroforaminal narrowing. Since her admission she does have some improvement. The arm still feels globally numb but less so. Having some slight discomfort on the dorsal hand. She went to see neurosurgery and was deemed not requiring any intervention. She is also taking new blood pressure medicine since her admission and feels somewhat uncomfortable with the medicine. Review of Systems   Neurological: Positive for sensory change. All other systems reviewed and are negative. Past Medical History:   Diagnosis Date    Bell's palsy     H/O seasonal allergies     Polycystic ovary      History reviewed. No pertinent family history. Social History     Social History    Marital status: SINGLE     Spouse name: N/A    Number of children: N/A    Years of education: N/A     Occupational History    Not on file. Social History Main Topics    Smoking status: Former Smoker    Smokeless tobacco: Not on file    Alcohol use Yes      Comment: socially    Drug use: No    Sexual activity: Not on file     Other Topics Concern    Not on file     Social History Narrative     Allergies   Allergen Reactions    Codeine Nausea Only    Sulfa (Sulfonamide Antibiotics) Hives and Swelling    Tetracycline Hives         Current Outpatient Prescriptions   Medication Sig    METFORMIN HCL (METFORMIN PO) Take  by mouth.  lisinopril (PRINIVIL, ZESTRIL) 10 mg tablet Take 1 Tab by mouth daily.  Indications: hypertension   Junie Cleveland, Outpatient physical therapy to evaluate and treat.  methyphenidate ER 27 mg 24 hr tab Take 27 mg by mouth every morning.  methylPREDNISolone (MEDROL DOSEPACK) 4 mg tablet Per dose pack instructions     No current facility-administered medications for this visit. Neurologic Exam     Mental Status        WD/WN adult in NAD, normal grooming  VSS  A&O x 3    PERRL, nonicteric  Face is symmetric, tongue midline  Speech is fluent and clear  No limb ataxia. No abnl movements. Moving all extemities spontaneously and symmetric  Normal gait     slightly depressed reflexes left  Brachial radialis    CVS RRR  Lungs nonlabored  Skin is warm and dry         Visit Vitals    /60    Pulse 71    Resp 18    Wt 75.8 kg (167 lb)    LMP 02/18/2017 (Exact Date)    SpO2 98%    BMI 26.95 kg/m2       Assessment and Plan   Mariama Wills was seen today for extremity weakness. Diagnoses and all orders for this visit:    Cervical radiculopathy at C6  -     REFERRAL TO PHYSICAL THERAPY    Herniated cervical disc      76-year-old woman who was originally admitted for possible stroke in the setting of left arm sensory and motor changes. She does have an abnormal C-spine showing herniated disc at C6-7 and some spinal stenosis in the same location. I do think this likely accounts for her presentation albeit atypical.  Her brain does not show any signs to suggest demyelinating disease or other acute process. It is possible that her long trip overseas could have irritated her C-spine. I am going to send her to physical therapy.   I discussed with her that if she feels acute worsening she needs to be reevaluated by a spine specialist.          2 [facility], [address] [facility] LINK

## 2017-04-17 ENCOUNTER — TELEPHONE (OUTPATIENT)
Dept: NEUROLOGY | Age: 32
End: 2017-04-17

## 2017-04-17 NOTE — TELEPHONE ENCOUNTER
Neurosurgeon recommended a nerve test, she said it wasn't anything Dr. Chapis Vasquez had mentioned so she wanted to run it by her. Please give her a call back.

## 2017-04-19 ENCOUNTER — HOSPITAL ENCOUNTER (OUTPATIENT)
Dept: PHYSICAL THERAPY | Age: 32
Discharge: HOME | End: 2017-04-19
Payer: COMMERCIAL

## 2017-04-19 PROCEDURE — 97161 PT EVAL LOW COMPLEX 20 MIN: CPT | Performed by: PHYSICAL THERAPIST

## 2017-04-19 NOTE — PROGRESS NOTES
[facility]  [address], [address] [place] [address]  Phone: [phone]  Fax: [phone]    Plan of Care/Statement of Necessity for Physical Therapy Services  2-15    Patient name: Diana Wilson  : 1985  Provider#: [id number]  Referral source: Veena Maza, *      Medical/Treatment Diagnosis: Radiculopathy, cervical region [M54.12]     Prior Hospitalization: see medical history     Comorbidities: Bels palsy  Prior Level of Function:  complete 20 minutes of exercise at least 3 times a week  Medications: Verified on Patient Summary List    Start of Care: 2017      Onset Date: 2017       The Plan of Care and following information is based on the information from the initial evaluation. Assessment/ mills information: 34 y.o. Female with insidious onset of left UE numbness/tingling/weakness covering C6-C8 dermatomes/myotomes with presence of cervical disc pathology at C5/6 and C6/7. Symptoms have reduced since beginning prednisone.      Evaluation Complexity History MEDIUM  Complexity : 1-2 comorbidities / personal factors will impact the outcome/ POC ; Examination HIGH Complexity : 4+ Standardized tests and measures addressing body structure, function, activity limitation and / or participation in recreation  ;Presentation LOW Complexity : Stable, uncomplicated  ;Clinical Decision Making MEDIUM Complexity : FOTO score of 26-74  Overall Complexity Rating: LOW     Problem List: pain affecting function, decrease ROM, decrease strength, decrease ADL/ functional abilitiies, decrease activity tolerance and decrease flexibility/ joint mobility   Treatment Plan may include any combination of the following: Therapeutic exercise, Therapeutic activities, Neuromuscular re-education, Physical agent/modality, Gait/balance training, Manual therapy, Patient education and Self Care training  Patient / Family readiness to learn indicated by: asking questions and trying to perform skills  Persons(s) to be included in education: patient (P)  Barriers to Learning/Limitations: None  Patient Goal (s): full use of left hand  Patient Self Reported Health Status: good  Rehabilitation Potential: good    Short Term Goals: To be accomplished in 3-4 weeks:  1) Pt will be independent with HEP  2) Pt will be able to perform grooming tasks with left hand  3) Pt will be able to demonstrate  strength >/= 40% of uninvolved side    Long Term Goals: To be accomplished in 4-8 weeks:  1) Pt will be able to resume cooking tasks with left hand  2) Pt will be able to demonstrate  strength >/= 60% of uninvolved side    Frequency / Duration: Patient to be seen 2 times per week for 4-8 weeks. Patient/ Caregiver education and instruction: activity modification    [x]  Plan of care has been reviewed with HAMZAH Zarate PT, DPT 4/19/2017 4:26 PM    ________________________________________________________________________    I certify that the above Therapy Services are being furnished while the patient is under my care. I agree with the treatment plan and certify that this therapy is necessary.     [de-identified] Signature:____________________  Date:____________Time: _________
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
## 2017-04-21 ENCOUNTER — DOCUMENTATION ONLY (OUTPATIENT)
Dept: NEUROLOGY | Age: 32
End: 2017-04-21

## 2017-04-24 ENCOUNTER — HOSPITAL ENCOUNTER (OUTPATIENT)
Dept: PHYSICAL THERAPY | Age: 32
Discharge: HOME | End: 2017-04-24
Payer: COMMERCIAL

## 2017-04-24 PROCEDURE — 97110 THERAPEUTIC EXERCISES: CPT | Performed by: PHYSICAL THERAPIST

## 2017-04-24 NOTE — PROGRESS NOTES
PT DAILY TREATMENT NOTE - [facility] 2-15    Patient Name: Teresa Albert  Date:2017  : 1985  [x]  Patient  Verified  Payor: Alan Alexis / Plan: [employer] Norman HMO / Product Type: HMO /    In time:120p  Out time:205  Total Treatment Time (min): 45  Total Timed Codes (min): 45  1:1 Treatment Time ([address] only): --   Visit #: 2     Treatment Area: Radiculopathy, cervical region [M54.12]    SUBJECTIVE  Pain Level (0-10 scale): 0/10  Any medication changes, allergies to medications, adverse drug reactions, diagnosis change, or new procedure performed?: [x] No    [] Yes (see summary sheet for update)  Subjective functional status/changes:   [] No changes reported  Pt states that she does not have any pain or numbness/tingling, just that the weakness is most limiting. OBJECTIVE          45 min Therapeutic Exercise:  [x] See flow sheet :   Rationale: increase ROM, increase strength, improve coordination and increase proprioception to improve the patients ability to perform grasping tasks              With   [x] TE   [] TA   [] neuro   [] other: Patient Education: [x] Review HEP    [] Progressed/Changed HEP based on:   [] positioning   [] body mechanics   [] transfers   [] heat/ice application    [] other:      Other Objective/Functional Measures: --     Pain Level (0-10 scale) post treatment: 0/10    ASSESSMENT/Changes in Function:   Began strengthening program today and provided HEP. Advance as tolerated. Patient will continue to benefit from skilled PT services to modify and progress therapeutic interventions, address functional mobility deficits, address ROM deficits, address strength deficits, analyze and address soft tissue restrictions, analyze and cue movement patterns, analyze and modify body mechanics/ergonomics and assess and modify postural abnormalities to attain remaining goals.      []  See Plan of Care  []  See progress note/recertification  []  See Discharge Summary         Progress towards goals / Updated goals:  Short Term Goals: To be accomplished in 3-4 weeks:  1) Pt will be independent with HEP  2) Pt will be able to perform grooming tasks with left hand  3) Pt will be able to demonstrate  strength >/= 40% of uninvolved side     Long Term Goals:  To be accomplished in 4-8 weeks:  1) Pt will be able to resume cooking tasks with left hand  2) Pt will be able to demonstrate  strength >/= 60% of uninvolved side       PLAN  []  Upgrade activities as tolerated     []  Continue plan of care  []  Update interventions per flow sheet       []  Discharge due to:_  []  Other:_      Vic Wilson, PT, DPT 4/24/2017  1:24 PM

## 2017-04-26 ENCOUNTER — APPOINTMENT (OUTPATIENT)
Dept: PHYSICAL THERAPY | Age: 32
End: 2017-04-26

## 2017-04-27 ENCOUNTER — HOSPITAL ENCOUNTER (OUTPATIENT)
Dept: PHYSICAL THERAPY | Age: 32
Discharge: HOME | End: 2017-04-27
Payer: COMMERCIAL

## 2017-04-27 PROCEDURE — 97110 THERAPEUTIC EXERCISES: CPT

## 2017-04-27 NOTE — PROGRESS NOTES
PT DAILY TREATMENT NOTE - [facility] 2-15    Patient Name: Darin Chan  Date:2017  : 1985  [x]  Patient  Verified  Payor: Prashanth Orosco / Plan: Wilver Moya HMO / Product Type: HMO /    In time: 2:10P  Out time: 2:55P  Total Treatment Time (min): 45  Total Timed Codes (min): 45  1:1 Treatment Time ( only): --   Visit #: 3    Treatment Area: Radiculopathy, cervical region [M54.12]    SUBJECTIVE  Pain Level (0-10 scale): 0/10  Any medication changes, allergies to medications, adverse drug reactions, diagnosis change, or new procedure performed?: [x] No    [] Yes (see summary sheet for update)  Subjective functional status/changes:   [] No changes reported  \"I am still not having any pain most of the time. I have noticed about a week ago I will get pain that comes and goes with some tingling on my L elbow. \"    OBJECTIVE    45 min Therapeutic Exercise:  [x] See flow sheet :   Rationale: increase ROM, increase strength, improve coordination and increase proprioception to improve the patients ability to perform grasping tasks         With   [x] TE   [] TA   [] neuro   [] other: Patient Education: [x] Review HEP    [] Progressed/Changed HEP based on:   [] positioning   [] body mechanics   [] transfers   [] heat/ice application    [] other:      Other Objective/Functional Measures: --     Pain Level (0-10 scale) post treatment: 0/10    ASSESSMENT/Changes in Function:   Pt advised when doing self assist wrist flexion and extension to control the eccentric lowering of wrist. Pt able to tolerate increase in strengthening without increase in pain or discomfort.    Patient will continue to benefit from skilled PT services to modify and progress therapeutic interventions, address functional mobility deficits, address ROM deficits, address strength deficits, analyze and address soft tissue restrictions, analyze and cue movement patterns, analyze and modify body mechanics/ergonomics and assess and modify postural abnormalities to attain remaining goals. [x]  See Plan of Care  []  See progress note/recertification  []  See Discharge Summary         Progress towards goals / Updated goals:  Short Term Goals: To be accomplished in 3-4 weeks:  1) Pt will be independent with HEP  2) Pt will be able to perform grooming tasks with left hand  3) Pt will be able to demonstrate  strength >/= 40% of uninvolved side     Long Term Goals:  To be accomplished in 4-8 weeks:  1) Pt will be able to resume cooking tasks with left hand  2) Pt will be able to demonstrate  strength >/= 60% of uninvolved side     PLAN  [x]  Upgrade activities as tolerated     [x]  Continue plan of care  []  Update interventions per flow sheet       []  Discharge due to:_  []  Other:_      Lorie Alexandra, PTA 4/27/2017  1:24 PM

## 2017-05-01 ENCOUNTER — APPOINTMENT (OUTPATIENT)
Dept: PHYSICAL THERAPY | Age: 32
End: 2017-05-01
Payer: COMMERCIAL

## 2017-05-02 ENCOUNTER — HOSPITAL ENCOUNTER (OUTPATIENT)
Dept: PHYSICAL THERAPY | Age: 32
Discharge: HOME | End: 2017-05-02
Payer: COMMERCIAL

## 2017-05-02 PROCEDURE — 97110 THERAPEUTIC EXERCISES: CPT

## 2017-05-02 NOTE — PROGRESS NOTES
PT DAILY TREATMENT NOTE - [facility] 2-15    Patient Name: Nikunj Reeves  Date:2017  : 1985  [x]  Patient  Verified  Payor: Veronica Peraless / Plan: [employer] HMO / Product Type: HMO /    In time: 10:45A  Out time: 11:35A  Total Treatment Time (min): 50  Total Timed Codes (min):50  1:1 Treatment Time ([address] only): --   Visit #: 4    Treatment Area: Radiculopathy, cervical region [M54.12]    SUBJECTIVE  Pain Level (0-10 scale): 0/10  Any medication changes, allergies to medications, adverse drug reactions, diagnosis change, or new procedure performed?: [x] No    [] Yes (see summary sheet for update)  Subjective functional status/changes:   [] No changes reported  \"I felt fatigued after last visit but I have had no increase in pain. \"    OBJECTIVE    50 min Therapeutic Exercise:  [x] See flow sheet :   Rationale: increase ROM, increase strength, improve coordination and increase proprioception to improve the patients ability to perform grasping tasks         With   [x] TE   [] TA   [] neuro   [] other: Patient Education: [x] Review HEP    [] Progressed/Changed HEP based on:   [] positioning   [] body mechanics   [] transfers   [] heat/ice application    [] other:      Other Objective/Functional Measures: --     Pain Level (0-10 scale) post treatment: 0/10    ASSESSMENT/Changes in Function:   Pt tolerated increase in strengthening without increase in pain. Pt unable to hold weight for more than 5 repetitions for shoulder shrugs. Continue to progress as tolerated. Patient will continue to benefit from skilled PT services to modify and progress therapeutic interventions, address functional mobility deficits, address ROM deficits, address strength deficits, analyze and address soft tissue restrictions, analyze and cue movement patterns, analyze and modify body mechanics/ergonomics and assess and modify postural abnormalities to attain remaining goals.      [x]  See Plan of Care  []  See progress note/recertification  []  See Discharge Summary         Progress towards goals / Updated goals:  Short Term Goals: To be accomplished in 3-4 weeks:  1) Pt will be independent with HEP  2) Pt will be able to perform grooming tasks with left hand  3) Pt will be able to demonstrate  strength >/= 40% of uninvolved side     Long Term Goals:  To be accomplished in 4-8 weeks:  1) Pt will be able to resume cooking tasks with left hand  2) Pt will be able to demonstrate  strength >/= 60% of uninvolved side     PLAN  [x]  Upgrade activities as tolerated     [x]  Continue plan of care  []  Update interventions per flow sheet       []  Discharge due to:_  []  Other:_      Oliverio De Los Santos PTA 5/2/2017  1:24 PM

## 2017-05-03 ENCOUNTER — TELEPHONE (OUTPATIENT)
Dept: NEUROLOGY | Age: 32
End: 2017-05-03

## 2017-05-03 ENCOUNTER — HOSPITAL ENCOUNTER (OUTPATIENT)
Dept: PHYSICAL THERAPY | Age: 32
Discharge: HOME | End: 2017-05-03
Payer: COMMERCIAL

## 2017-05-03 PROCEDURE — 97110 THERAPEUTIC EXERCISES: CPT | Performed by: PHYSICAL THERAPIST

## 2017-05-03 NOTE — TELEPHONE ENCOUNTER
Requesting a phone call back from the Dr, has questions about the patient's progress. Please call Benita Young, [phone].

## 2017-05-03 NOTE — PROGRESS NOTES
PT DAILY TREATMENT NOTE - [facility] 2-15    Patient Name: Eulogio Comer  Date:5/3/2017  : 1985  [x]  Patient  Verified  Payor: Cayla Samples / Plan: Mitzi Phillips HMO / Product Type: HMO /    In time:810a  Out time:855a  Total Treatment Time (min): 35  Total Timed Codes (min): 35  1:1 Treatment Time ( only): --   Visit #: 5     Treatment Area: Radiculopathy, cervical region [M54.12]    SUBJECTIVE  Pain Level (0-10 scale): 0/10  Any medication changes, allergies to medications, adverse drug reactions, diagnosis change, or new procedure performed?: [x] No    [] Yes (see summary sheet for update)  Subjective functional status/changes:   [] No changes reported  Pt states that she feels like her wrist is getting weaker with supination. No other improvement noted. OBJECTIVE          35 min Therapeutic Exercise:  [] See flow sheet : assessment of current status   Rationale: increase ROM, increase strength, improve coordination and increase proprioception to improve the patients ability to improve grasping performance            With   [x] TE   [] TA   [] neuro   [] other: Patient Education: [x] Review HEP    [] Progressed/Changed HEP based on:   [] positioning   [] body mechanics   [] transfers   [] heat/ice application    [] other:      Other Objective/Functional Measures: 3# grasp hold time 1:35. Pain Level (0-10 scale) post treatment: 0/10    ASSESSMENT/Changes in Function:   Unable to maintain eccentric control of wrist supination against gravity today. No notable changes today. Patient will continue to benefit from skilled PT services to modify and progress therapeutic interventions, address functional mobility deficits, address ROM deficits, address strength deficits, analyze and address soft tissue restrictions, analyze and cue movement patterns and analyze and modify body mechanics/ergonomics to attain remaining goals.      []  See Plan of Care  []  See progress note/recertification  []  See Discharge Summary         Progress towards goals / Updated goals:  Short Term Goals: To be accomplished in 3-4 weeks:  1) Pt will be independent with HEP Progressing  2) Pt will be able to perform grooming tasks with left hand  3) Pt will be able to demonstrate  strength >/= 40% of uninvolved side      Long Term Goals:  To be accomplished in 4-8 weeks:  1) Pt will be able to resume cooking tasks with left hand  2) Pt will be able to demonstrate  strength >/= 60% of uninvolved side        PLAN  []  Upgrade activities as tolerated     [x]  Continue plan of care  []  Update interventions per flow sheet       []  Discharge due to:_  []  Other:_      Maris Lombard, PT, DPT 5/3/2017  10:48 AM

## 2017-05-12 ENCOUNTER — HOSPITAL ENCOUNTER (OUTPATIENT)
Dept: PHYSICAL THERAPY | Age: 32
Discharge: HOME | End: 2017-05-12
Payer: COMMERCIAL

## 2017-05-12 PROCEDURE — 97110 THERAPEUTIC EXERCISES: CPT | Performed by: PHYSICAL THERAPIST

## 2017-05-12 NOTE — PROGRESS NOTES
PT DAILY TREATMENT NOTE - [facility] 2-15    Patient Name: Pito Denney  Date:2017  : 1985  [x]  Patient  Verified  Payor: Bhavik Laird / Plan: Lon Fothergill HMO / Product Type: HMO /    In time:810a  Out time:855a  Total Treatment Time (min): 40  Total Timed Codes (min): 40  1:1 Treatment Time ( only): --   Visit #: 6     Treatment Area: Radiculopathy, cervical region [M54.12]    SUBJECTIVE  Pain Level (0-10 scale): 0/10  Any medication changes, allergies to medications, adverse drug reactions, diagnosis change, or new procedure performed?: [x] No    [] Yes (see summary sheet for update)  Subjective functional status/changes:   [] No changes reported  Pt states that she has noticed the her toes have gone numb since last Friday with the right a little worse than the left. She states that the numbness in the toes are not affecting her balance. She does not notice that her hand numbness worsened while riding in the car but improved some since she got out of the car.     OBJECTIVE      40 min Therapeutic Exercise:  [x] See flow sheet : review of HEP assessment of current status   Rationale: increase ROM, increase strength, improve coordination, improve balance and increase proprioception to improve the patients ability to improve grasp        With   [x] TE   [] TA   [] neuro   [] other: Patient Education: [x] Review HEP    [] Progressed/Changed HEP based on:   [] positioning   [] body mechanics   [] transfers   [] heat/ice application    [] other:      Other Objective/Functional Measures:  FOTO Functional Measure: Intake 68/100 Discharge 63/100  Normal cervical mobility  Normal DTR for bicep, patella and achilles tendons bilaterally  Spurling Test: negative  Cervical distraction: Negative  Cervical compression: Negative    Pain Level (0-10 scale) post treatment: 0/10    ASSESSMENT/Changes in Function:        []  See Plan of Care  []  See progress note/recertification  [x]  See Discharge Summary         Progress towards goals / Updated goals:  Short Term Goals: To be accomplished in 3-4 weeks:  1) Pt will be independent with HEP MET  2) Pt will be able to perform grooming tasks with left hand NOT MET  3) Pt will be able to demonstrate  strength >/= 40% of uninvolved side NOT MET      Long Term Goals:  To be accomplished in 4-8 weeks:  1) Pt will be able to resume cooking tasks with left hand NOT MET  2) Pt will be able to demonstrate  strength >/= 60% of uninvolved side NOT MET           PLAN  []  Upgrade activities as tolerated     []  Continue plan of care  []  Update interventions per flow sheet       [x]  Discharge due to:_ patient will be seeking second opinion  []  Other:_      Mikie Beckford, PT, DPT 5/12/2017  8:23 AM

## 2017-05-12 NOTE — ANCILLARY DISCHARGE INSTRUCTIONS
[facility]  [address] [address], [address] [place] [address]  Phone: [phone]  Fax: [phone]    Discharge Summary  2-15    Patient name: Taj Cleveland  : 1985  Provider#: [id number]  Referral source: Rolando Pascal, *      Medical/Treatment Diagnosis: Radiculopathy, cervical region [M54.12]     Prior Hospitalization: see medical history     Comorbidities: Bels palsy  Prior Level of Function: complete 20 minutes of exercise at least 3 times a week  Medications: Verified on Patient Summary List     Start of Care: 2017           Onset Date: 2017    Visits from Start of Care: 6     Missed Visits: 0  Reporting Period : 2017 to 2017    Progress towards goals / Updated goals:  Short Term Goals: To be accomplished in 3-4 weeks:  1) Pt will be independent with HEP MET  2) Pt will be able to perform grooming tasks with left hand NOT MET  3) Pt will be able to demonstrate  strength >/= 40% of uninvolved side NOT MET      Long Term Goals: To be accomplished in 4-8 weeks:  1) Pt will be able to resume cooking tasks with left hand NOT MET  2) Pt will be able to demonstrate  strength >/= 60% of uninvolved side NOT MET          ASSESSMENT/SUMMARY OF CARE:  Tariq Alamo states that she has had return of bilateral numbness tingling in her feet since last Friday. I have reviewed a full cervical exam and still unable to find conclusive evidence to drive treatment. Her cervical spine does not seem to be of a source which I can provide treatment to resolve her symptoms. I am not sure what is the source at this time. She has been provided a specific strengthening program for the left UE weakness on the chance that the muscular weakness is residual and has the capability to improve. I have recommended second opinion at this time as I have been unsuccessful at identifying a source and a resolution of her symptoms.     FOTO Functional Measure: Intake 68/100 Discharge 63/100  Normal cervical mobility  Normal DTR for bicep, patella and achilles tendons bilaterally  Spurling Test: negative  Cervical distraction: Negative  Cervical compression: Negative    RECOMMENDATIONS:  [x]Discontinue therapy: []Patient has reached or is progressing toward set goals      []Patient is non-compliant or has abdicated      [x]Due to lack of appreciable progress towards set goals    Tommie Larson, PT, DPT 5/12/2017 3:49 PM

## 2017-06-28 ENCOUNTER — TELEPHONE (OUTPATIENT)
Dept: NEUROLOGY | Age: 32
End: 2017-06-28

## 2020-12-17 ENCOUNTER — OFFICE VISIT (OUTPATIENT)
Dept: URGENT CARE | Age: 35
End: 2020-12-17
Payer: COMMERCIAL

## 2020-12-17 VITALS — RESPIRATION RATE: 17 BRPM | HEART RATE: 80 BPM | TEMPERATURE: 99.2 F | OXYGEN SATURATION: 99 %

## 2020-12-17 DIAGNOSIS — J02.9 SORE THROAT: ICD-10-CM

## 2020-12-17 DIAGNOSIS — R68.83 CHILLS: Primary | ICD-10-CM

## 2020-12-17 DIAGNOSIS — Z11.59 SCREENING FOR VIRAL DISEASE: ICD-10-CM

## 2020-12-17 PROCEDURE — 99203 OFFICE O/P NEW LOW 30 MIN: CPT | Performed by: FAMILY MEDICINE

## 2020-12-17 RX ORDER — ACETAMINOPHEN 160 MG/5ML
SUSPENSION, ORAL (FINAL DOSE FORM) ORAL
COMMUNITY

## 2020-12-17 RX ORDER — GUAIFENESIN 100 MG/5ML
81 LIQUID (ML) ORAL DAILY
COMMUNITY

## 2020-12-17 RX ORDER — ACETYLCYSTEINE 600 MG
600 CAPSULE ORAL DAILY
COMMUNITY

## 2020-12-17 RX ORDER — CHOLECALCIFEROL (VITAMIN D3) 125 MCG
CAPSULE ORAL
COMMUNITY

## 2020-12-17 RX ORDER — ASCORBIC ACID 500 MG
500 TABLET ORAL DAILY
COMMUNITY

## 2020-12-17 RX ORDER — LANOLIN ALCOHOL/MO/W.PET/CERES
3 CREAM (GRAM) TOPICAL AT BEDTIME
COMMUNITY

## 2020-12-17 RX ORDER — LABETALOL 100 MG/1
50 TABLET, FILM COATED ORAL DAILY
COMMUNITY

## 2020-12-17 RX ORDER — GLUCOSAM/CHONDRO/HERB 149/HYAL 750-100 MG
TABLET ORAL
COMMUNITY

## 2020-12-17 NOTE — LETTER
December 17, 2020 Lisy Clements [phone] [id number] Dear Paola Dia: Thank you for requesting access to [employer]. Please follow the instructions below to securely access and download your online medical record. [facility] allows you to send messages to your doctor, view your test results, renew your prescriptions, schedule appointments, and more. How Do I Sign Up? 1. In your internet browser, go to [email or web address]. "[employer]. 2. Click on the First Time User? Click Here link in the Sign In box. You will see the New Member Sign Up page. 3. Enter your Interfolio Access Code exactly as it appears below. You will not need to use this code after youve completed the sign-up process. If you do not sign up before the expiration date, you must request a new code. Interfolio Access Code: [email or web address] Expires: 1/31/2021  6:52 PM  
 
4. Enter the last four digits of your Social Security Number (xxxx) and Date of Birth (mm/dd/yyyy) as indicated and click Submit. You will be taken to the next sign-up page. 5. Create a Interfolio ID. This will be your Interfolio login ID and cannot be changed, so think of one that is secure and easy to remember. 6. Create a Interfolio password. You can change your password at any time. 7. Enter your Password Reset Question and Answer. This can be used at a later time if you forget your password. 8. Enter your e-mail address. You will receive e-mail notification when new information is available in [address]. 9. Click Sign Up. You can now view and download portions of your medical record. 10. Click the Download Summary menu link to download a portable copy of your medical information. Additional Information If you have questions, please visit the Frequently Asked Questions section of the [employer]folio website at [email or web address]. "[email or web address]/. Remember, [employer] is NOT to be used for urgent needs. For medical emergencies, dial 911. Now available from your iPhone and Android! Sincerely, [employer]

## 2020-12-18 NOTE — PROGRESS NOTES
This patient was seen at [address] [place] [address] [facility]gent Care while in their vehicle due to COVID-19 pandemic with PPE and focused examination in order to decrease community viral transmission. The patient/guardian gave verbal consent to treat. Levi Zhu is a 38 y.o. female who presents with chills 3 days, now with ST. No known COVID-19 contacts. Denies cough, fever, SOB. Eating/drinking well. The history is provided by the patient. Past Medical History:   Diagnosis Date    Bell's palsy     H/O seasonal allergies     Polycystic ovary     TIA (transient ischemic attack)         Past Surgical History:   Procedure Laterality Date    HX ORTHOPAEDIC      L shoulder         History reviewed. No pertinent family history.      Social History     Socioeconomic History    Marital status: SINGLE     Spouse name: Not on file    Number of children: Not on file    Years of education: Not on file    Highest education level: Not on file   Occupational History    Not on file   Social Needs    Financial resource strain: Not on file    Food insecurity     Worry: Not on file     Inability: Not on file    Transportation needs     Medical: Not on file     Non-medical: Not on file   Tobacco Use    Smoking status: Former Smoker    Smokeless tobacco: Never Used   Substance and Sexual Activity    Alcohol use: Yes     Comment: socially    Drug use: No    Sexual activity: Not on file   Lifestyle    Physical activity     Days per week: Not on file     Minutes per session: Not on file    Stress: Not on file   Relationships    Social connections     Talks on phone: Not on file     Gets together: Not on file     Attends Congregation service: Not on file     Active member of club or organization: Not on file     Attends meetings of clubs or organizations: Not on file     Relationship status: Not on file    Intimate partner violence     Fear of current or ex partner: Not on file     Emotionally abused: Not on file Physically abused: Not on file     Forced sexual activity: Not on file   Other Topics Concern    Not on file   Social History Narrative    Not on file                ALLERGIES: Codeine, Sulfa (sulfonamide antibiotics), and Tetracycline    Review of Systems   Constitutional: Positive for chills. Negative for activity change, appetite change and fever. HENT: Positive for sore throat. Negative for congestion and rhinorrhea. Respiratory: Negative for cough, shortness of breath and wheezing. Cardiovascular: Negative for chest pain. Gastrointestinal: Negative for abdominal pain, diarrhea, nausea and vomiting. Musculoskeletal: Negative for myalgias. Neurological: Negative for headaches. Vitals:    12/17/20 1919   Pulse: 80   Resp: 17   Temp: 99.2 °F (37.3 °C)   SpO2: 99%       Physical Exam  Vitals signs and nursing note reviewed. Constitutional:       General: She is not in acute distress. Appearance: She is well-developed. She is not diaphoretic. HENT:      Mouth/Throat:      Mouth: Mucous membranes are moist.      Pharynx: Oropharynx is clear. No oropharyngeal exudate or posterior oropharyngeal erythema. Pulmonary:      Effort: Pulmonary effort is normal. No respiratory distress. Breath sounds: Normal breath sounds. No stridor. No wheezing, rhonchi or rales. Neurological:      Mental Status: She is alert. Psychiatric:         Behavior: Behavior normal.         Thought Content: Thought content normal.         Judgment: Judgment normal.         MDM    ICD-10-CM ICD-9-CM   1. Chills  R68.83 780.64   2. Sore throat  J02.9 462   3.  Screening for viral disease  Z11.59 V73.99       Orders Placed This Encounter    NOVEL CORONAVIRUS (COVID-19)     Scheduling Instructions:      1) Due to current limited availability of the COVID-19 PCR test, tests will be prioritized and may not be completed.              2) Order only if the test result will change clinical management or necessary for a return to mission-critical employment decision.              3) Print and instruct patient to adhere to CDC home isolation program. (Link Above)              4) Set up or refer patient for a monitoring program.              5) Have patient sign up for and leverage MyChart (if not previously done). Order Specific Question:   Is this test for diagnosis or screening? Answer:   Diagnosis of ill patient     Order Specific Question:   Symptomatic for COVID-19 as defined by CDC? Answer:   Yes     Order Specific Question:   Date of Symptom Onset     Answer:   12/14/2020     Order Specific Question:   Hospitalized for COVID-19? Answer:   No     Order Specific Question:   Admitted to ICU for COVID-19? Answer:   No     Order Specific Question:   Employed in healthcare setting? Answer:   No     Order Specific Question:   Resident in a congregate (group) care setting? Answer:   No     Order Specific Question:   Pregnant? Answer:   No     Order Specific Question:   Previously tested for COVID-19? Answer:   No      Quarantine  Deep breathing exercises, ambulation  Tylenol prn  Salt water gargles    If signs and symptoms become worse the pt is to go to the ER.          Procedures

## 2020-12-20 LAB — SARS-COV-2, NAA: NOT DETECTED
